# Patient Record
Sex: MALE | Race: BLACK OR AFRICAN AMERICAN | URBAN - METROPOLITAN AREA
[De-identification: names, ages, dates, MRNs, and addresses within clinical notes are randomized per-mention and may not be internally consistent; named-entity substitution may affect disease eponyms.]

---

## 2023-05-06 ENCOUNTER — INPATIENT (INPATIENT)
Facility: HOSPITAL | Age: 83
LOS: 7 days | Discharge: HOME | End: 2023-05-14
Attending: THORACIC SURGERY (CARDIOTHORACIC VASCULAR SURGERY) | Admitting: THORACIC SURGERY (CARDIOTHORACIC VASCULAR SURGERY)
Payer: MEDICARE

## 2023-05-06 VITALS
OXYGEN SATURATION: 100 % | RESPIRATION RATE: 15 BRPM | TEMPERATURE: 98 F | SYSTOLIC BLOOD PRESSURE: 129 MMHG | HEART RATE: 81 BPM | DIASTOLIC BLOOD PRESSURE: 64 MMHG

## 2023-05-06 LAB
ALBUMIN SERPL ELPH-MCNC: 4.2 G/DL — SIGNIFICANT CHANGE UP (ref 3.3–5)
ALP SERPL-CCNC: 84 U/L — SIGNIFICANT CHANGE UP (ref 40–120)
ALT FLD-CCNC: 19 U/L — SIGNIFICANT CHANGE UP (ref 4–41)
ANION GAP SERPL CALC-SCNC: 11 MMOL/L — SIGNIFICANT CHANGE UP (ref 7–14)
AST SERPL-CCNC: 23 U/L — SIGNIFICANT CHANGE UP (ref 4–40)
BASE EXCESS BLDV CALC-SCNC: -0.9 MMOL/L — SIGNIFICANT CHANGE UP (ref -2–3)
BASOPHILS # BLD AUTO: 0.03 K/UL — SIGNIFICANT CHANGE UP (ref 0–0.2)
BASOPHILS NFR BLD AUTO: 0.3 % — SIGNIFICANT CHANGE UP (ref 0–2)
BILIRUB SERPL-MCNC: 0.6 MG/DL — SIGNIFICANT CHANGE UP (ref 0.2–1.2)
BLOOD GAS VENOUS COMPREHENSIVE RESULT: SIGNIFICANT CHANGE UP
BUN SERPL-MCNC: 27 MG/DL — HIGH (ref 7–23)
CALCIUM SERPL-MCNC: 9.5 MG/DL — SIGNIFICANT CHANGE UP (ref 8.4–10.5)
CHLORIDE BLDV-SCNC: 104 MMOL/L — SIGNIFICANT CHANGE UP (ref 96–108)
CHLORIDE SERPL-SCNC: 101 MMOL/L — SIGNIFICANT CHANGE UP (ref 98–107)
CO2 BLDV-SCNC: 24.6 MMOL/L — SIGNIFICANT CHANGE UP (ref 22–26)
CO2 SERPL-SCNC: 26 MMOL/L — SIGNIFICANT CHANGE UP (ref 22–31)
CREAT SERPL-MCNC: 1.69 MG/DL — HIGH (ref 0.5–1.3)
EGFR: 40 ML/MIN/1.73M2 — LOW
EOSINOPHIL # BLD AUTO: 0.02 K/UL — SIGNIFICANT CHANGE UP (ref 0–0.5)
EOSINOPHIL NFR BLD AUTO: 0.2 % — SIGNIFICANT CHANGE UP (ref 0–6)
GAS PNL BLDV: 121 MMOL/L — LOW (ref 136–145)
GAS PNL BLDV: SIGNIFICANT CHANGE UP
GLUCOSE BLDV-MCNC: 405 MG/DL — HIGH (ref 70–99)
GLUCOSE SERPL-MCNC: 442 MG/DL — HIGH (ref 70–99)
HCO3 BLDV-SCNC: 24 MMOL/L — SIGNIFICANT CHANGE UP (ref 22–29)
HCT VFR BLD CALC: 38.1 % — LOW (ref 39–50)
HCT VFR BLDA CALC: 34 % — LOW (ref 39–51)
HGB BLD CALC-MCNC: 11.3 G/DL — LOW (ref 12.6–17.4)
HGB BLD-MCNC: 12.1 G/DL — LOW (ref 13–17)
IANC: 9.68 K/UL — HIGH (ref 1.8–7.4)
IMM GRANULOCYTES NFR BLD AUTO: 0.4 % — SIGNIFICANT CHANGE UP (ref 0–0.9)
LACTATE BLDV-MCNC: 1.7 MMOL/L — SIGNIFICANT CHANGE UP (ref 0.5–2)
LYMPHOCYTES # BLD AUTO: 1.2 K/UL — SIGNIFICANT CHANGE UP (ref 1–3.3)
LYMPHOCYTES # BLD AUTO: 10.3 % — LOW (ref 13–44)
MCHC RBC-ENTMCNC: 27.1 PG — SIGNIFICANT CHANGE UP (ref 27–34)
MCHC RBC-ENTMCNC: 31.8 GM/DL — LOW (ref 32–36)
MCV RBC AUTO: 85.2 FL — SIGNIFICANT CHANGE UP (ref 80–100)
MONOCYTES # BLD AUTO: 0.72 K/UL — SIGNIFICANT CHANGE UP (ref 0–0.9)
MONOCYTES NFR BLD AUTO: 6.2 % — SIGNIFICANT CHANGE UP (ref 2–14)
NEUTROPHILS # BLD AUTO: 9.68 K/UL — HIGH (ref 1.8–7.4)
NEUTROPHILS NFR BLD AUTO: 82.6 % — HIGH (ref 43–77)
NRBC # BLD: 0 /100 WBCS — SIGNIFICANT CHANGE UP (ref 0–0)
NRBC # FLD: 0 K/UL — SIGNIFICANT CHANGE UP (ref 0–0)
PCO2 BLDV: 37 MMHG — LOW (ref 42–55)
PH BLDV: 7.41 — SIGNIFICANT CHANGE UP (ref 7.32–7.43)
PLATELET # BLD AUTO: 179 K/UL — SIGNIFICANT CHANGE UP (ref 150–400)
PO2 BLDV: 36 MMHG — SIGNIFICANT CHANGE UP (ref 25–45)
POTASSIUM BLDV-SCNC: SIGNIFICANT CHANGE UP MMOL/L (ref 3.5–5.1)
POTASSIUM SERPL-MCNC: 4.6 MMOL/L — SIGNIFICANT CHANGE UP (ref 3.5–5.3)
POTASSIUM SERPL-SCNC: 4.6 MMOL/L — SIGNIFICANT CHANGE UP (ref 3.5–5.3)
PROT SERPL-MCNC: 6.2 G/DL — SIGNIFICANT CHANGE UP (ref 6–8.3)
RBC # BLD: 4.47 M/UL — SIGNIFICANT CHANGE UP (ref 4.2–5.8)
RBC # FLD: 13.4 % — SIGNIFICANT CHANGE UP (ref 10.3–14.5)
SAO2 % BLDV: 66.2 % — LOW (ref 67–88)
SODIUM SERPL-SCNC: 138 MMOL/L — SIGNIFICANT CHANGE UP (ref 135–145)
TROPONIN T, HIGH SENSITIVITY RESULT: 23 NG/L — SIGNIFICANT CHANGE UP
WBC # BLD: 11.7 K/UL — HIGH (ref 3.8–10.5)
WBC # FLD AUTO: 11.7 K/UL — HIGH (ref 3.8–10.5)

## 2023-05-06 PROCEDURE — 99285 EMERGENCY DEPT VISIT HI MDM: CPT

## 2023-05-06 PROCEDURE — 71046 X-RAY EXAM CHEST 2 VIEWS: CPT | Mod: 26

## 2023-05-06 RX ORDER — ACETAMINOPHEN 500 MG
975 TABLET ORAL ONCE
Refills: 0 | Status: COMPLETED | OUTPATIENT
Start: 2023-05-06 | End: 2023-05-06

## 2023-05-06 RX ORDER — LIDOCAINE 4 G/100G
1 CREAM TOPICAL ONCE
Refills: 0 | Status: COMPLETED | OUTPATIENT
Start: 2023-05-06 | End: 2023-05-06

## 2023-05-06 RX ORDER — SODIUM CHLORIDE 9 MG/ML
1000 INJECTION INTRAMUSCULAR; INTRAVENOUS; SUBCUTANEOUS ONCE
Refills: 0 | Status: COMPLETED | OUTPATIENT
Start: 2023-05-06 | End: 2023-05-06

## 2023-05-06 RX ADMIN — LIDOCAINE 1 PATCH: 4 CREAM TOPICAL at 23:49

## 2023-05-06 RX ADMIN — Medication 975 MILLIGRAM(S): at 22:42

## 2023-05-06 RX ADMIN — SODIUM CHLORIDE 1000 MILLILITER(S): 9 INJECTION INTRAMUSCULAR; INTRAVENOUS; SUBCUTANEOUS at 22:42

## 2023-05-06 NOTE — ED ADULT NURSE NOTE - OBJECTIVE STATEMENT
83 y/o male with hx CAD with 2 stents, HTN, DM, and chronic back pain presents to the ED c/o LUQ abd pain s/p fall. Pt denies HS, LOC, HA, dizziness, CP, SOB, n/v/d, or any other c/o. Abd soft, nontender, nondistended. No obvious deformities or injuries noted. Pt takes Plavix.

## 2023-05-06 NOTE — ED ADULT TRIAGE NOTE - CHIEF COMPLAINT QUOTE
presents C/O LUQ pain S/P fall. Pt endorsing SOB. on Plavix. No complaints of chest pain, headache, nausea, dizziness, vomiting  SOB, fever, chills verbalized. Pmhx DM chronic backpain.  HTN

## 2023-05-06 NOTE — ED ADULT NURSE NOTE - NSIMPLEMENTINTERV_GEN_ALL_ED
Implemented All Fall Risk Interventions:  [place] to call system. Call bell, personal items and telephone within reach. Instruct patient to call for assistance. Room bathroom lighting operational. Non-slip footwear when patient is off stretcher. Physically safe environment: no spills, clutter or unnecessary equipment. Stretcher in lowest position, wheels locked, appropriate side rails in place. Provide visual cue, wrist band, yellow gown, etc. Monitor gait and stability. Monitor for mental status changes and reorient to person, place, and time. Review medications for side effects contributing to fall risk. Reinforce activity limits and safety measures with patient and family.

## 2023-05-07 DIAGNOSIS — J94.2 HEMOTHORAX: ICD-10-CM

## 2023-05-07 DIAGNOSIS — I10 ESSENTIAL (PRIMARY) HYPERTENSION: ICD-10-CM

## 2023-05-07 DIAGNOSIS — N40.0 BENIGN PROSTATIC HYPERPLASIA WITHOUT LOWER URINARY TRACT SYMPTOMS: ICD-10-CM

## 2023-05-07 DIAGNOSIS — E11.9 TYPE 2 DIABETES MELLITUS WITHOUT COMPLICATIONS: ICD-10-CM

## 2023-05-07 DIAGNOSIS — E78.5 HYPERLIPIDEMIA, UNSPECIFIED: ICD-10-CM

## 2023-05-07 DIAGNOSIS — H40.9 UNSPECIFIED GLAUCOMA: ICD-10-CM

## 2023-05-07 LAB
ANION GAP SERPL CALC-SCNC: 15 MMOL/L — HIGH (ref 7–14)
APTT BLD: 27.6 SEC — SIGNIFICANT CHANGE UP (ref 27–36.3)
BUN SERPL-MCNC: 28 MG/DL — HIGH (ref 7–23)
CALCIUM SERPL-MCNC: 9.2 MG/DL — SIGNIFICANT CHANGE UP (ref 8.4–10.5)
CHLORIDE SERPL-SCNC: 103 MMOL/L — SIGNIFICANT CHANGE UP (ref 98–107)
CO2 SERPL-SCNC: 21 MMOL/L — LOW (ref 22–31)
CREAT SERPL-MCNC: 1.28 MG/DL — SIGNIFICANT CHANGE UP (ref 0.5–1.3)
EGFR: 56 ML/MIN/1.73M2 — LOW
GLUCOSE BLDC GLUCOMTR-MCNC: 311 MG/DL — HIGH (ref 70–99)
GLUCOSE BLDC GLUCOMTR-MCNC: 336 MG/DL — HIGH (ref 70–99)
GLUCOSE BLDC GLUCOMTR-MCNC: 381 MG/DL — HIGH (ref 70–99)
GLUCOSE BLDC GLUCOMTR-MCNC: 414 MG/DL — HIGH (ref 70–99)
GLUCOSE BLDC GLUCOMTR-MCNC: 415 MG/DL — HIGH (ref 70–99)
GLUCOSE SERPL-MCNC: 332 MG/DL — HIGH (ref 70–99)
GLUCOSE SERPL-MCNC: 364 MG/DL — HIGH (ref 70–99)
INR BLD: 1.4 RATIO — HIGH (ref 0.88–1.16)
MAGNESIUM SERPL-MCNC: 2.3 MG/DL — SIGNIFICANT CHANGE UP (ref 1.6–2.6)
PHOSPHATE SERPL-MCNC: 2.9 MG/DL — SIGNIFICANT CHANGE UP (ref 2.5–4.5)
POTASSIUM SERPL-MCNC: 4.4 MMOL/L — SIGNIFICANT CHANGE UP (ref 3.5–5.3)
POTASSIUM SERPL-SCNC: 4.4 MMOL/L — SIGNIFICANT CHANGE UP (ref 3.5–5.3)
PROTHROM AB SERPL-ACNC: 16.3 SEC — HIGH (ref 10.5–13.4)
SODIUM SERPL-SCNC: 139 MMOL/L — SIGNIFICANT CHANGE UP (ref 135–145)

## 2023-05-07 PROCEDURE — 93306 TTE W/DOPPLER COMPLETE: CPT | Mod: 26

## 2023-05-07 PROCEDURE — 99233 SBSQ HOSP IP/OBS HIGH 50: CPT

## 2023-05-07 PROCEDURE — 70450 CT HEAD/BRAIN W/O DYE: CPT | Mod: 26,MA

## 2023-05-07 PROCEDURE — 71250 CT THORAX DX C-: CPT | Mod: 26,MA

## 2023-05-07 RX ORDER — DEXTROSE 50 % IN WATER 50 %
12.5 SYRINGE (ML) INTRAVENOUS ONCE
Refills: 0 | Status: DISCONTINUED | OUTPATIENT
Start: 2023-05-07 | End: 2023-05-08

## 2023-05-07 RX ORDER — INSULIN LISPRO 100/ML
8 VIAL (ML) SUBCUTANEOUS ONCE
Refills: 0 | Status: COMPLETED | OUTPATIENT
Start: 2023-05-07 | End: 2023-05-07

## 2023-05-07 RX ORDER — SENNA PLUS 8.6 MG/1
2 TABLET ORAL AT BEDTIME
Refills: 0 | Status: DISCONTINUED | OUTPATIENT
Start: 2023-05-07 | End: 2023-05-14

## 2023-05-07 RX ORDER — SODIUM CHLORIDE 9 MG/ML
500 INJECTION, SOLUTION INTRAVENOUS ONCE
Refills: 0 | Status: COMPLETED | OUTPATIENT
Start: 2023-05-07 | End: 2023-05-07

## 2023-05-07 RX ORDER — ACETAMINOPHEN 500 MG
650 TABLET ORAL EVERY 6 HOURS
Refills: 0 | Status: DISCONTINUED | OUTPATIENT
Start: 2023-05-07 | End: 2023-05-07

## 2023-05-07 RX ORDER — INSULIN GLARGINE 100 [IU]/ML
20 INJECTION, SOLUTION SUBCUTANEOUS AT BEDTIME
Refills: 0 | Status: DISCONTINUED | OUTPATIENT
Start: 2023-05-07 | End: 2023-05-08

## 2023-05-07 RX ORDER — ACETAMINOPHEN 500 MG
1000 TABLET ORAL EVERY 8 HOURS
Refills: 0 | Status: COMPLETED | OUTPATIENT
Start: 2023-05-07 | End: 2023-05-08

## 2023-05-07 RX ORDER — DEXTROSE 50 % IN WATER 50 %
25 SYRINGE (ML) INTRAVENOUS ONCE
Refills: 0 | Status: DISCONTINUED | OUTPATIENT
Start: 2023-05-07 | End: 2023-05-08

## 2023-05-07 RX ORDER — INSULIN GLARGINE 100 [IU]/ML
10 INJECTION, SOLUTION SUBCUTANEOUS ONCE
Refills: 0 | Status: COMPLETED | OUTPATIENT
Start: 2023-05-07 | End: 2023-05-07

## 2023-05-07 RX ORDER — SODIUM CHLORIDE 9 MG/ML
1000 INJECTION, SOLUTION INTRAVENOUS
Refills: 0 | Status: DISCONTINUED | OUTPATIENT
Start: 2023-05-07 | End: 2023-05-08

## 2023-05-07 RX ORDER — SIMVASTATIN 20 MG/1
20 TABLET, FILM COATED ORAL AT BEDTIME
Refills: 0 | Status: DISCONTINUED | OUTPATIENT
Start: 2023-05-07 | End: 2023-05-14

## 2023-05-07 RX ORDER — INSULIN LISPRO 100/ML
15 VIAL (ML) SUBCUTANEOUS
Refills: 0 | Status: DISCONTINUED | OUTPATIENT
Start: 2023-05-07 | End: 2023-05-08

## 2023-05-07 RX ORDER — FINASTERIDE 5 MG/1
5 TABLET, FILM COATED ORAL DAILY
Refills: 0 | Status: DISCONTINUED | OUTPATIENT
Start: 2023-05-07 | End: 2023-05-14

## 2023-05-07 RX ORDER — POLYETHYLENE GLYCOL 3350 17 G/17G
17 POWDER, FOR SOLUTION ORAL DAILY
Refills: 0 | Status: DISCONTINUED | OUTPATIENT
Start: 2023-05-07 | End: 2023-05-14

## 2023-05-07 RX ORDER — PHYTONADIONE (VIT K1) 5 MG
5 TABLET ORAL ONCE
Refills: 0 | Status: COMPLETED | OUTPATIENT
Start: 2023-05-07 | End: 2023-05-07

## 2023-05-07 RX ORDER — SODIUM CHLORIDE 9 MG/ML
250 INJECTION, SOLUTION INTRAVENOUS ONCE
Refills: 0 | Status: COMPLETED | OUTPATIENT
Start: 2023-05-07 | End: 2023-05-07

## 2023-05-07 RX ORDER — ASPIRIN/CALCIUM CARB/MAGNESIUM 324 MG
81 TABLET ORAL DAILY
Refills: 0 | Status: DISCONTINUED | OUTPATIENT
Start: 2023-05-07 | End: 2023-05-11

## 2023-05-07 RX ORDER — OXYCODONE HYDROCHLORIDE 5 MG/1
10 TABLET ORAL EVERY 8 HOURS
Refills: 0 | Status: DISCONTINUED | OUTPATIENT
Start: 2023-05-07 | End: 2023-05-11

## 2023-05-07 RX ORDER — GLUCAGON INJECTION, SOLUTION 0.5 MG/.1ML
1 INJECTION, SOLUTION SUBCUTANEOUS ONCE
Refills: 0 | Status: DISCONTINUED | OUTPATIENT
Start: 2023-05-07 | End: 2023-05-08

## 2023-05-07 RX ORDER — OXYCODONE HYDROCHLORIDE 5 MG/1
5 TABLET ORAL EVERY 6 HOURS
Refills: 0 | Status: DISCONTINUED | OUTPATIENT
Start: 2023-05-07 | End: 2023-05-11

## 2023-05-07 RX ORDER — CARVEDILOL PHOSPHATE 80 MG/1
12.5 CAPSULE, EXTENDED RELEASE ORAL EVERY 12 HOURS
Refills: 0 | Status: DISCONTINUED | OUTPATIENT
Start: 2023-05-07 | End: 2023-05-10

## 2023-05-07 RX ORDER — LIDOCAINE 4 G/100G
1 CREAM TOPICAL DAILY
Refills: 0 | Status: DISCONTINUED | OUTPATIENT
Start: 2023-05-07 | End: 2023-05-14

## 2023-05-07 RX ORDER — INSULIN LISPRO 100/ML
VIAL (ML) SUBCUTANEOUS
Refills: 0 | Status: DISCONTINUED | OUTPATIENT
Start: 2023-05-07 | End: 2023-05-08

## 2023-05-07 RX ORDER — DEXTROSE 50 % IN WATER 50 %
25 SYRINGE (ML) INTRAVENOUS ONCE
Refills: 0 | Status: DISCONTINUED | OUTPATIENT
Start: 2023-05-07 | End: 2023-05-14

## 2023-05-07 RX ORDER — DEXTROSE 50 % IN WATER 50 %
15 SYRINGE (ML) INTRAVENOUS ONCE
Refills: 0 | Status: DISCONTINUED | OUTPATIENT
Start: 2023-05-07 | End: 2023-05-08

## 2023-05-07 RX ADMIN — Medication 400 MILLIGRAM(S): at 18:33

## 2023-05-07 RX ADMIN — Medication 1000 MILLIGRAM(S): at 19:00

## 2023-05-07 RX ADMIN — Medication 4: at 10:09

## 2023-05-07 RX ADMIN — FINASTERIDE 5 MILLIGRAM(S): 5 TABLET, FILM COATED ORAL at 12:27

## 2023-05-07 RX ADMIN — Medication 6: at 17:02

## 2023-05-07 RX ADMIN — LIDOCAINE 1 PATCH: 4 CREAM TOPICAL at 12:54

## 2023-05-07 RX ADMIN — OXYCODONE HYDROCHLORIDE 10 MILLIGRAM(S): 5 TABLET ORAL at 12:27

## 2023-05-07 RX ADMIN — Medication 81 MILLIGRAM(S): at 12:27

## 2023-05-07 RX ADMIN — LIDOCAINE 1 PATCH: 4 CREAM TOPICAL at 20:00

## 2023-05-07 RX ADMIN — Medication 15 UNIT(S): at 17:00

## 2023-05-07 RX ADMIN — POLYETHYLENE GLYCOL 3350 17 GRAM(S): 17 POWDER, FOR SOLUTION ORAL at 12:28

## 2023-05-07 RX ADMIN — OXYCODONE HYDROCHLORIDE 10 MILLIGRAM(S): 5 TABLET ORAL at 13:00

## 2023-05-07 RX ADMIN — OXYCODONE HYDROCHLORIDE 10 MILLIGRAM(S): 5 TABLET ORAL at 21:45

## 2023-05-07 RX ADMIN — Medication 101 MILLIGRAM(S): at 12:27

## 2023-05-07 RX ADMIN — LIDOCAINE 1 PATCH: 4 CREAM TOPICAL at 11:00

## 2023-05-07 RX ADMIN — INSULIN GLARGINE 10 UNIT(S): 100 INJECTION, SOLUTION SUBCUTANEOUS at 12:54

## 2023-05-07 RX ADMIN — Medication 8 UNIT(S): at 20:30

## 2023-05-07 RX ADMIN — LIDOCAINE 1 PATCH: 4 CREAM TOPICAL at 07:45

## 2023-05-07 RX ADMIN — Medication 1000 MILLIGRAM(S): at 10:30

## 2023-05-07 RX ADMIN — INSULIN GLARGINE 20 UNIT(S): 100 INJECTION, SOLUTION SUBCUTANEOUS at 21:15

## 2023-05-07 RX ADMIN — SIMVASTATIN 20 MILLIGRAM(S): 20 TABLET, FILM COATED ORAL at 21:14

## 2023-05-07 RX ADMIN — OXYCODONE HYDROCHLORIDE 10 MILLIGRAM(S): 5 TABLET ORAL at 20:45

## 2023-05-07 RX ADMIN — SODIUM CHLORIDE 100 MILLILITER(S): 9 INJECTION, SOLUTION INTRAVENOUS at 19:49

## 2023-05-07 RX ADMIN — CARVEDILOL PHOSPHATE 12.5 MILLIGRAM(S): 80 CAPSULE, EXTENDED RELEASE ORAL at 18:34

## 2023-05-07 RX ADMIN — Medication 400 MILLIGRAM(S): at 10:00

## 2023-05-07 RX ADMIN — SODIUM CHLORIDE 500 MILLILITER(S): 9 INJECTION, SOLUTION INTRAVENOUS at 18:35

## 2023-05-07 RX ADMIN — SODIUM CHLORIDE 250 MILLILITER(S): 9 INJECTION, SOLUTION INTRAVENOUS at 09:42

## 2023-05-07 RX ADMIN — SODIUM CHLORIDE 100 MILLILITER(S): 9 INJECTION, SOLUTION INTRAVENOUS at 09:42

## 2023-05-07 NOTE — H&P ADULT - PROBLEM SELECTOR PLAN 1
1. Admit to Thoracic Surgery / CTICU  2. Incentive Spirometer  3. Pain management  4. IV Hydration  5. Cardiac Clearance / ECHO  6. Will need OR for VATS and clot evacuation one P2Y-12 level is normal, holding Plavix, continuing ASA  7. Discussed with Dr. Hassan

## 2023-05-07 NOTE — H&P ADULT - RESPIRATORY
no wheezes/no rales/no rhonchi/no respiratory distress/no use of accessory muscles/no subcutaneous emphysema/airway patent/good air movement/respirations non-labored

## 2023-05-07 NOTE — ED PROVIDER NOTE - NS ED ROS FT
CONSTITUTIONAL: No fevers, no chills, no lightheadedness, no dizziness  EYES: no visual changes, no eye pain  EARS: no ear drainage, no ear pain, no change in hearing  NOSE: no nasal congestion  MOUTH/THROAT: no sore throat  CV: No chest pain, no palpitations  RESP: No SOB, no cough  GI: No n/v/d, no abd pain  : no dysuria, no hematuria, no flank pain  MSK: no back pain, no extremity pain, + r. sided chest wall pain/rib pain  SKIN: no rashes  NEURO: no headache, no focal weakness, no decreased sensation/parasthesias   PSYCHIATRIC: no known mental health issues

## 2023-05-07 NOTE — PROGRESS NOTE ADULT - SUBJECTIVE AND OBJECTIVE BOX
CHIEF COMPLAINT: FOLLOW UP IN ICU FOR Left rib fractures left hemothorax      ISSUES:     Left 7-10 rib fractures  Left hemothorax  Pulmonary contusion  NIAN  Uncontrolled hyperglycemia  Pain  CAD with stents ~13y ago on ASA and plavix  HTN  DM  Hyperlipidemia  BPH  Glaucoma      INTERVAL EVENTS:     Presented to Er after fall at home, said he did get dizzy, did not lose consciousness. CT head negative for any acute intracranial abnormality. CT chest with left 7th-10th rib fractures, left hemothorax.  Denies pain/trauma to abdomen    Awake, on room air, normotensive. Hyperglycemic.    Plan for OR later this week after holding plavix/continue Asa 81, cardiology clearance. Blood sugars need controlled, start IVF for likely prerenal NAIN. Recheck BMP this afternoon    Needs pain control with rib fractures to avoid atelectasis/PNA, underlying pulmonary contusion.          HISTORY:   Patient reports moderate pain at chest wall     PHYSICAL EXAM:   Gen: Comfortable, No acute distress  Eyes: Sclera white, Conjunctiva normal, Eyelids normal, Pupils symmetrical   ENT: Mucous membranes moist,  ,  ,    Neck: Trachea midline,  ,  ,  ,  ,  ,    CV: Rate regular, Rhythm regular,  ,  ,    Resp: Breath sounds clear, No accessory muscles use, chest wall tenderness.  Abd: Soft, Non-distended, Non-tender, Bowel sounds normal,  ,  ,    Skin: Warm, No peripheral edema of lower extremities,  ,    : No briscoe  Neuro: Moving all 4 extremities,    Psych: A&Ox3      ASSESSMENT AND PLAN:     NEURO:  Rib fractures, chest Pain - Stable. Pain control with dilaudid and Tylenol IV              RESPIRATORY:  Hypoxia - Wean nasal cannula for goal O2sat above 92. Obtain CXR. Incentive spirometry. Chest PT and frequent suctioning. Continue bronchodilators. OOB to chair & ambulate w/ assistance. Continuous pulse oximetry for support & to prevent decompensation.       Hemothorax- Needs to go to OR later this week for LVATS, drainage of hemothorax and decortication after holding plavix and cardiology clearance            CARDIOVASCULAR:  Hemodynamically stable - Not on pressors. Continue hemodynamic monitoring.  Telemetry (medical test) - Reviewed by me today independently. Normal sinus rhythm.   CAD- hold plavix for surgery, continue ASa 81, continue coreg/statin. Check echo, cardiology consult. Stents ~ 13y ago per patient.             RENAL:  NAIN- Likely prerenal, Start IVF, monitor urine output. Recheck BMP this afternoon.  Monitor IOs and electrolytes. Keep K above 4.0 and Mg above 2.0.          GASTROINTESTINAL:  GI prophylaxis not indicated  Zofran and Reglan IV PRN for nausea  Regular consistency diet             HEMATOLOGIC:  No signs of active bleeding. Monitor Hgb in CBC in AM  DVT prophylaxis with SCDs, holding off heparin SC in setting of hemothorax.           INFECTIOUS DISEASE:  All surgical sites appear clean. No signs of active infection. Will monitor for fever and leukocytosis.               ENDOCRINE:  Stable – Monitor glucose fingersticks for goal 120-180.               Pertinent clinical, laboratory, radiographic, hemodynamic, echocardiographic, respiratory data, microbiologic data and chart were reviewed by myself and analyzed frequently throughout the course of the day and night by myself.    Plan discussed at length with the CTICU staff and Attending CT Surgeon -   Dr Mathieu Hassan.    Patient's status was discussed with patient at bedside.    ________________________________________________    _________________________  VITAL SIGNS:  Vital Signs Last 24 Hrs  T(C): 37 (07 May 2023 07:30), Max: 37.2 (07 May 2023 01:15)  T(F): 98.6 (07 May 2023 07:30), Max: 98.9 (07 May 2023 01:15)  HR: 71 (07 May 2023 09:00) (71 - 89)  BP: 146/61 (07 May 2023 08:00) (124/63 - 146/61)  BP(mean): 84 (07 May 2023 08:00) (84 - 90)  RR: 14 (07 May 2023 09:00) (14 - 20)  SpO2: 99% (07 May 2023 09:00) (99% - 100%)    Parameters below as of 07 May 2023 09:00  Patient On (Oxygen Delivery Method): room air      I/Os:   I&O's Detail    07 May 2023 07:01  -  07 May 2023 11:38  --------------------------------------------------------  IN:    Lactated Ringers: 200 mL    Lactated Ringers Bolus: 500 mL  Total IN: 700 mL    OUT:    Voided (mL): 150 mL  Total OUT: 150 mL    Total NET: 550 mL              MEDICATIONS:  MEDICATIONS  (STANDING):  acetaminophen   IVPB .. 1000 milliGRAM(s) IV Intermittent every 8 hours  aspirin enteric coated 81 milliGRAM(s) Oral daily  carvedilol 12.5 milliGRAM(s) Oral every 12 hours  dextrose 5%. 1000 milliLiter(s) (50 mL/Hr) IV Continuous <Continuous>  dextrose 5%. 1000 milliLiter(s) (100 mL/Hr) IV Continuous <Continuous>  dextrose 50% Injectable 25 Gram(s) IV Push once  dextrose 50% Injectable 25 Gram(s) IV Push once  dextrose 50% Injectable 12.5 Gram(s) IV Push once  finasteride 5 milliGRAM(s) Oral daily  glucagon  Injectable 1 milliGRAM(s) IntraMuscular once  insulin glargine Injectable (LANTUS) 20 Unit(s) SubCutaneous at bedtime  insulin glargine Injectable (LANTUS) 10 Unit(s) SubCutaneous once  insulin lispro (ADMELOG) corrective regimen sliding scale   SubCutaneous three times a day before meals  insulin lispro Injectable (ADMELOG) 15 Unit(s) SubCutaneous three times a day before meals  lactated ringers. 1000 milliLiter(s) (100 mL/Hr) IV Continuous <Continuous>  lidocaine   4% Patch 1 Patch Transdermal daily  phytonadione  IVPB 5 milliGRAM(s) IV Intermittent once  polyethylene glycol 3350 17 Gram(s) Oral daily  senna 2 Tablet(s) Oral at bedtime  simvastatin 20 milliGRAM(s) Oral at bedtime    MEDICATIONS  (PRN):  dextrose Oral Gel 15 Gram(s) Oral once PRN Blood Glucose LESS THAN 70 milliGRAM(s)/deciliter  oxyCODONE    IR 10 milliGRAM(s) Oral every 8 hours PRN Severe Pain (7 - 10)  oxyCODONE    IR 5 milliGRAM(s) Oral every 6 hours PRN Moderate Pain (4 - 6)      LABS:  Laboratory data was independently reviewed by me today.                           12.1   11.70 )-----------( 179      ( 06 May 2023 22:15 )             38.1     05-07    x   |  x   |  x   ----------------------------<  332<H>  x    |  x   |  x     Ca    9.5      06 May 2023 22:15    TPro  6.2  /  Alb  4.2  /  TBili  0.6  /  DBili  x   /  AST  23  /  ALT  19  /  AlkPhos  84  05-06    LIVER FUNCTIONS - ( 06 May 2023 22:15 )  Alb: 4.2 g/dL / Pro: 6.2 g/dL / ALK PHOS: 84 U/L / ALT: 19 U/L / AST: 23 U/L / GGT: x           PT/INR - ( 07 May 2023 09:15 )   PT: 16.3 sec;   INR: 1.40 ratio         PTT - ( 07 May 2023 09:15 )  PTT:27.6 sec        RADIOLOGY:   Radiology images were independently reviewed by me today. Reports were reviewed by me today.    Xray Chest 2 Views PA/Lat:   ACC: [id number] EXAM:  XR CHEST PA LAT 2V   ORDERED BY: REGI TORRES     PROCEDURE DATE:  05/06/2023          INTERPRETATION:  EXAMINATION: XR CHEST PA AND LATERAL    CLINICAL INDICATION: Left chest wall tenderness.    TECHNIQUE: 2 views; Frontal and lateral views of the chest were obtained.    COMPARISON: None.    FINDINGS:  The heart is normal in size.  Hazy left opacity reflecting large left pleural effusion with atelectasis.  Right lung is clear. No pneumothorax.    IMPRESSION:  Large left pleural effusion with atelectasis.    --- End of Report ---          MARIAH GOLD MD; Resident Radiologist  This document has been electronically signed.  FREDDY WISE MD; Attending Radiologist  This document has been electronically signed. May  7 2023 7:57AM (05-06-23 @ 23:02)    _________________________  VITAL SIGNS:  Vital Signs Last 24 Hrs  T(C): 37 (07 May 2023 07:30), Max: 37.2 (07 May 2023 01:15)  T(F): 98.6 (07 May 2023 07:30), Max: 98.9 (07 May 2023 01:15)  HR: 71 (07 May 2023 09:00) (71 - 89)  BP: 146/61 (07 May 2023 08:00) (124/63 - 146/61)  BP(mean): 84 (07 May 2023 08:00) (84 - 90)  RR: 14 (07 May 2023 09:00) (14 - 20)  SpO2: 99% (07 May 2023 09:00) (99% - 100%)    Parameters below as of 07 May 2023 09:00  Patient On (Oxygen Delivery Method): room air      I/Os:   I&O's Detail    07 May 2023 07:01  -  07 May 2023 11:24  --------------------------------------------------------  IN:    Lactated Ringers: 200 mL    Lactated Ringers Bolus: 500 mL  Total IN: 700 mL    OUT:    Voided (mL): 150 mL  Total OUT: 150 mL    Total NET: 550 mL              MEDICATIONS:  MEDICATIONS  (STANDING):  acetaminophen   IVPB .. 1000 milliGRAM(s) IV Intermittent every 8 hours  aspirin enteric coated 81 milliGRAM(s) Oral daily  carvedilol 12.5 milliGRAM(s) Oral every 12 hours  dextrose 5%. 1000 milliLiter(s) (100 mL/Hr) IV Continuous <Continuous>  dextrose 5%. 1000 milliLiter(s) (50 mL/Hr) IV Continuous <Continuous>  dextrose 50% Injectable 25 Gram(s) IV Push once  dextrose 50% Injectable 12.5 Gram(s) IV Push once  dextrose 50% Injectable 25 Gram(s) IV Push once  finasteride 5 milliGRAM(s) Oral daily  glucagon  Injectable 1 milliGRAM(s) IntraMuscular once  insulin glargine Injectable (LANTUS) 10 Unit(s) SubCutaneous once  insulin glargine Injectable (LANTUS) 20 Unit(s) SubCutaneous at bedtime  insulin lispro (ADMELOG) corrective regimen sliding scale   SubCutaneous three times a day before meals  insulin lispro Injectable (ADMELOG) 15 Unit(s) SubCutaneous three times a day before meals  lactated ringers. 1000 milliLiter(s) (100 mL/Hr) IV Continuous <Continuous>  lidocaine   4% Patch 1 Patch Transdermal daily  phytonadione  IVPB 5 milliGRAM(s) IV Intermittent once  polyethylene glycol 3350 17 Gram(s) Oral daily  senna 2 Tablet(s) Oral at bedtime  simvastatin 20 milliGRAM(s) Oral at bedtime    MEDICATIONS  (PRN):  dextrose Oral Gel 15 Gram(s) Oral once PRN Blood Glucose LESS THAN 70 milliGRAM(s)/deciliter  oxyCODONE    IR 10 milliGRAM(s) Oral every 8 hours PRN Severe Pain (7 - 10)  oxyCODONE    IR 5 milliGRAM(s) Oral every 6 hours PRN Moderate Pain (4 - 6)      LABS:  Laboratory data was independently reviewed by me today.                           12.1   11.70 )-----------( 179      ( 06 May 2023 22:15 )             38.1     05-07    x   |  x   |  x   ----------------------------<  332<H>  x    |  x   |  x     Ca    9.5      06 May 2023 22:15    TPro  6.2  /  Alb  4.2  /  TBili  0.6  /  DBili  x   /  AST  23  /  ALT  19  /  AlkPhos  84  05-06    LIVER FUNCTIONS - ( 06 May 2023 22:15 )  Alb: 4.2 g/dL / Pro: 6.2 g/dL / ALK PHOS: 84 U/L / ALT: 19 U/L / AST: 23 U/L / GGT: x           PT/INR - ( 07 May 2023 09:15 )   PT: 16.3 sec;   INR: 1.40 ratio         PTT - ( 07 May 2023 09:15 )  PTT:27.6 sec        RADIOLOGY:   Radiology images were independently reviewed by me today. Reports were reviewed by me today.    Xray Chest 2 Views PA/Lat:   ACC: [id number] EXAM:  XR CHEST PA LAT 2V   ORDERED BY: REGI TORRES     PROCEDURE DATE:  05/06/2023          INTERPRETATION:  EXAMINATION: XR CHEST PA AND LATERAL    CLINICAL INDICATION: Left chest wall tenderness.    TECHNIQUE: 2 views; Frontal and lateral views of the chest were obtained.    COMPARISON: None.    FINDINGS:  The heart is normal in size.  Hazy left opacity reflecting large left pleural effusion with atelectasis.  Right lung is clear. No pneumothorax.    IMPRESSION:  Large left pleural effusion with atelectasis.    --- End of Report ---          MARIAH GOLD MD; Resident Radiologist  This document has been electronically signed.  FREDDY WISE MD; Attending Radiologist  This document has been electronically signed. May  7 2023 7:57AM (05-06-23 @ 23:02)

## 2023-05-07 NOTE — H&P ADULT - HISTORY OF PRESENT ILLNESS
83 y/o M w/ Hx of HTN, HLD, DM, CAD s/p Stents (13 years ago) on Aspirin & Plavix who had a mechanical fall at home yesterday. He states that he was in bed and that when he was getting out of bed he fell and hit his left chest wall on the radiator. He denies hitting his head and LOC. Initially he was treated at home by his family but later in the day he became cool and clammy and had a possible syncopal episode, again he denies hitting his head and LOC. At this point her was brought to the ED.     In the ED he had a CT scan and was found to have fractured ribs (7-10) on the left side and a moderate hemothorax. He denies CP, SOB, & dyspnea. States that his left sided chest pain is controlled.     He was admitted directly to the CTICU for observation.  81 y/o M w/ Hx of HTN, HLD, DM, CAD s/p Stents (13 years ago) on Aspirin & Plavix who had a mechanical fall at home yesterday. He states that he was in bed and that when he was getting out of bed he fell and hit his left chest wall on the radiator. He denies hitting his head and LOC. Initially he was treated at home by his family but later in the day he became cool and clammy and had a possible syncopal episode, again he denies hitting his head and LOC. At this point he was brought to the ED.     In the ED he had a CT scan and was found to have fractured ribs (7-10) on the left side and a moderate hemothorax. He denies CP, SOB, & dyspnea. States that his left sided chest pain is controlled.     He was admitted directly to the CTICU for observation.

## 2023-05-07 NOTE — H&P ADULT - GASTROINTESTINAL
soft/nontender/nondistended/normal active bowel sounds/no guarding/no rigidity/no organomegaly negative

## 2023-05-07 NOTE — ED PROVIDER NOTE - ATTENDING CONTRIBUTION TO CARE
I have personally performed a face to face medical and diagnostic evaluation of the patient. I have discussed with and reviewed the Resident's note and agree with the History, ROS, Physical Exam and MDM unless otherwise indicated. A brief summary of my personal evaluation and impression can be found below.     82y M w/ pMHx of hypertension, diabetes, CAD on aspirin and Plavix presents to the emergency department status post fall w/ L lateral rib pain and one episode of syncope after fall.  Patient states he he was climbing out of bed when he fell and he hit the radiator with the left side of his chest wall. Denies head trauma or LOC, fall witnessed by wife who accompanied pt and confirms story. Nephew was also present for visit. Nephew states his aunt called him to come and assess pt as he works in medical field, when he got there, he helped patient into bed and when patient went to stand, had syncopal episode lasting a few seconds before patient returned to baseline mental status. Nephew states that patient was caught by him and placed back in bed after syncope and patient complained of tunnel vision and lightheadedness when attempting to stand. Denies headache, chest pain, sob, cough, nausea, vomiting, focal weakness or numbness. Patient does complain of pain to L lateral chest wall with deep inspiration. Had not taken anything for pain.     On exam: +ttp to L lateral ribs 7-10, no ecchymosis, no gross deformity, no LUQ tenderness, abd soft, NT to palpation, no cva tenderness, lungs ctab b/l, A&Ox4, VS WNL, no m/r/g, no LE ecchymosis    A/P: C/f rib fx, no clinical concern for pneumothorax at this time, abd soft, no c/f splenic laceration, will obtain CT chest, CT head and C-spine, labs, pain meds, EKG, and reassess. Dispo pending w/u and ambulation. Syncope likely 2/2 vasovagal from pain, no clinical suspicion at this time for ACS.

## 2023-05-07 NOTE — ED PROVIDER NOTE - OBJECTIVE STATEMENT
Patient is a 82-year-old male with a past medical history of hypertension, diabetes, CAD on aspirin and Plavix.  Who presents to the emergency department status post fall.  Patient states he he was climbing out of bed when he fell and he hit the radiator with the left side of his chest.  Patient is now complaining of left upper quadrant pain and left-sided rib pain.  Patient also states it hurts when he takes a deep breath.  However, after the fall he did not come into the emergency department to get evaluated.  The fall was around 2 PM.  Patient was given symptomatic treatment and by his family.  A few hours after the fall the patient became cold and clammy, had a questionable syncopal episode with no preceding symptoms.  Patient did not hit his head and had loss of consciousness according to the family members.  EMS was called at this point and the patient was transported to the emergency department.  Patient denies any chest pain, fever, loss of vision, blurry vision, neurological deficits at this time.

## 2023-05-07 NOTE — H&P ADULT - PROBLEM SELECTOR PLAN 3
1. Diabetic Diet  2. 10 Lantus now (FS > 300 and not treated in ED)  3. Resume home Lantus dose  4. Resume meal time insulin doses  5. ISS  6. FS with meals and at bedtime

## 2023-05-07 NOTE — ED PROVIDER NOTE - PROGRESS NOTE DETAILS
Dr. Pasquale Kumar DO (ED ATTENDING):  Imaging showing multiple left-sided rib fractures with associated moderate hemothorax and atelectasis.  Patient comfortable, breathing on room air with no respiratory distress.  Case discussed with trauma surgery at [facility] for potential transfer (Dr. PEREA), who is recommending we consult CT surgery for hemothorax to decide if patient meets criteria for transfer.  CT surgery saw patient and requesting patient to be admitted to our hospital.  Patient aware of plan of care

## 2023-05-07 NOTE — PATIENT PROFILE ADULT - FALL HARM RISK - HARM RISK INTERVENTIONS
Communicate Risk of Fall with Harm to all staff/Reinforce activity limits and safety measures with patient and family/Tailored Fall Risk Interventions/Visual Cue: Yellow wristband and red socks/Bed in lowest position, wheels locked, appropriate side rails in place/Call bell, personal items and telephone in reach/Instruct patient to call for assistance before getting out of bed or chair/Non-slip footwear when patient is out of bed/[place] to call system/Physically safe environment - no spills, clutter or unnecessary equipment/Purposeful Proactive Rounding/Room/bathroom lighting operational, light cord in reach Assistance with ambulation/Assistance OOB with selected safe patient handling equipment/Communicate Risk of Fall with Harm to all staff/Discuss with provider need for PT consult/Monitor gait and stability/Provide patient with walking aids - walker, cane, crutches/Reinforce activity limits and safety measures with patient and family/Tailored Fall Risk Interventions/Visual Cue: Yellow wristband and red socks/Bed in lowest position, wheels locked, appropriate side rails in place/Call bell, personal items and telephone in reach/Instruct patient to call for assistance before getting out of bed or chair/Non-slip footwear when patient is out of bed/[place] to call system/Physically safe environment - no spills, clutter or unnecessary equipment/Purposeful Proactive Rounding/Room/bathroom lighting operational, light cord in reach

## 2023-05-07 NOTE — ED PROVIDER NOTE - PHYSICAL EXAMINATION
Physical Exam:  Gen: NAD, AOx3, non-toxic appearing, able to ambulate without assistance  Head: NCAT  HEENT: EOMI, PEERLA, normal conjunctiva, tongue midline, oral mucosa moist  Lung: CTAB, no respiratory distress, no wheezes/rhonchi/rales B/L, speaking in full sentences  CV: RRR  Abd: soft, NT, ND, no guarding, no rigidity, no rebound tenderness, no CVA tenderness   MSK: no visible deformities, ROM normal in UE/LE, no back pain, + left sided chest wall tenderness.   Neuro: No focal sensory or motor deficits  Skin: Warm, well perfused, no rash, no leg swelling  Psych: normal affect, calm

## 2023-05-07 NOTE — H&P ADULT - NSICDXPASTMEDICALHX_GEN_ALL_CORE_FT
PAST MEDICAL HISTORY:  BPH (benign prostatic hyperplasia)     CAD (coronary artery disease)     DM (diabetes mellitus)     Glaucoma     HLD (hyperlipidemia)     HTN (hypertension)

## 2023-05-07 NOTE — ED PROVIDER NOTE - CLINICAL SUMMARY MEDICAL DECISION MAKING FREE TEXT BOX
Patient presents emergency department complaining of shortness of breath and status post fall.  Patient is hemodynamically stable afebrile on presentation.  Patient satting well on room air.  Given patient's presentation and history we will obtain cardiac work-up to rule out any acute ACS causes.  We will also obtain CT head and CT chest to evaluate given patient's thinners.  Pending labs and imaging for disposition.  Patient's physical exam is significant for left-sided chest wall tenderness otherwise unremarkable.

## 2023-05-08 LAB
ANION GAP SERPL CALC-SCNC: 10 MMOL/L — SIGNIFICANT CHANGE UP (ref 7–14)
ANION GAP SERPL CALC-SCNC: 11 MMOL/L — SIGNIFICANT CHANGE UP (ref 7–14)
ANION GAP SERPL CALC-SCNC: 11 MMOL/L — SIGNIFICANT CHANGE UP (ref 7–14)
APTT BLD: 23.7 SEC — LOW (ref 27–36.3)
APTT BLD: 23.9 SEC — LOW (ref 27–36.3)
BUN SERPL-MCNC: 27 MG/DL — HIGH (ref 7–23)
BUN SERPL-MCNC: 29 MG/DL — HIGH (ref 7–23)
BUN SERPL-MCNC: 36 MG/DL — HIGH (ref 7–23)
CALCIUM SERPL-MCNC: 8.9 MG/DL — SIGNIFICANT CHANGE UP (ref 8.4–10.5)
CALCIUM SERPL-MCNC: 9.1 MG/DL — SIGNIFICANT CHANGE UP (ref 8.4–10.5)
CALCIUM SERPL-MCNC: 9.2 MG/DL — SIGNIFICANT CHANGE UP (ref 8.4–10.5)
CHLORIDE SERPL-SCNC: 104 MMOL/L — SIGNIFICANT CHANGE UP (ref 98–107)
CHLORIDE SERPL-SCNC: 105 MMOL/L — SIGNIFICANT CHANGE UP (ref 98–107)
CHLORIDE SERPL-SCNC: 105 MMOL/L — SIGNIFICANT CHANGE UP (ref 98–107)
CO2 SERPL-SCNC: 25 MMOL/L — SIGNIFICANT CHANGE UP (ref 22–31)
CO2 SERPL-SCNC: 25 MMOL/L — SIGNIFICANT CHANGE UP (ref 22–31)
CO2 SERPL-SCNC: 27 MMOL/L — SIGNIFICANT CHANGE UP (ref 22–31)
CREAT SERPL-MCNC: 1.29 MG/DL — SIGNIFICANT CHANGE UP (ref 0.5–1.3)
CREAT SERPL-MCNC: 1.3 MG/DL — SIGNIFICANT CHANGE UP (ref 0.5–1.3)
CREAT SERPL-MCNC: 1.51 MG/DL — HIGH (ref 0.5–1.3)
EGFR: 46 ML/MIN/1.73M2 — LOW
EGFR: 55 ML/MIN/1.73M2 — LOW
EGFR: 56 ML/MIN/1.73M2 — LOW
GLUCOSE BLDC GLUCOMTR-MCNC: 217 MG/DL — HIGH (ref 70–99)
GLUCOSE BLDC GLUCOMTR-MCNC: 226 MG/DL — HIGH (ref 70–99)
GLUCOSE BLDC GLUCOMTR-MCNC: 235 MG/DL — HIGH (ref 70–99)
GLUCOSE BLDC GLUCOMTR-MCNC: 249 MG/DL — HIGH (ref 70–99)
GLUCOSE BLDC GLUCOMTR-MCNC: 278 MG/DL — HIGH (ref 70–99)
GLUCOSE BLDC GLUCOMTR-MCNC: 90 MG/DL — SIGNIFICANT CHANGE UP (ref 70–99)
GLUCOSE SERPL-MCNC: 124 MG/DL — HIGH (ref 70–99)
GLUCOSE SERPL-MCNC: 216 MG/DL — HIGH (ref 70–99)
GLUCOSE SERPL-MCNC: 263 MG/DL — HIGH (ref 70–99)
HCT VFR BLD CALC: 26.9 % — LOW (ref 39–50)
HCT VFR BLD CALC: 30.1 % — LOW (ref 39–50)
HCT VFR BLD CALC: 31 % — LOW (ref 39–50)
HCT VFR BLD CALC: 31.5 % — LOW (ref 39–50)
HGB BLD-MCNC: 8.6 G/DL — LOW (ref 13–17)
HGB BLD-MCNC: 9.4 G/DL — LOW (ref 13–17)
HGB BLD-MCNC: 9.7 G/DL — LOW (ref 13–17)
HGB BLD-MCNC: 9.7 G/DL — LOW (ref 13–17)
INR BLD: 1.2 RATIO — HIGH (ref 0.88–1.16)
INR BLD: 1.4 RATIO — HIGH (ref 0.88–1.16)
LACTATE SERPL-SCNC: 1.2 MMOL/L — SIGNIFICANT CHANGE UP (ref 0.5–2)
MAGNESIUM SERPL-MCNC: 2.1 MG/DL — SIGNIFICANT CHANGE UP (ref 1.6–2.6)
MAGNESIUM SERPL-MCNC: 2.2 MG/DL — SIGNIFICANT CHANGE UP (ref 1.6–2.6)
MAGNESIUM SERPL-MCNC: 2.3 MG/DL — SIGNIFICANT CHANGE UP (ref 1.6–2.6)
MCHC RBC-ENTMCNC: 26.6 PG — LOW (ref 27–34)
MCHC RBC-ENTMCNC: 26.8 PG — LOW (ref 27–34)
MCHC RBC-ENTMCNC: 26.9 PG — LOW (ref 27–34)
MCHC RBC-ENTMCNC: 27.3 PG — SIGNIFICANT CHANGE UP (ref 27–34)
MCHC RBC-ENTMCNC: 30.8 GM/DL — LOW (ref 32–36)
MCHC RBC-ENTMCNC: 31.2 GM/DL — LOW (ref 32–36)
MCHC RBC-ENTMCNC: 31.3 GM/DL — LOW (ref 32–36)
MCHC RBC-ENTMCNC: 32 GM/DL — SIGNIFICANT CHANGE UP (ref 32–36)
MCV RBC AUTO: 85.3 FL — SIGNIFICANT CHANGE UP (ref 80–100)
MCV RBC AUTO: 85.4 FL — SIGNIFICANT CHANGE UP (ref 80–100)
MCV RBC AUTO: 85.6 FL — SIGNIFICANT CHANGE UP (ref 80–100)
MCV RBC AUTO: 87.3 FL — SIGNIFICANT CHANGE UP (ref 80–100)
NRBC # BLD: 0 /100 WBCS — SIGNIFICANT CHANGE UP (ref 0–0)
NRBC # FLD: 0 K/UL — SIGNIFICANT CHANGE UP (ref 0–0)
PA ADP PRP-ACNC: 314 PRU — SIGNIFICANT CHANGE UP (ref 194–418)
PHOSPHATE SERPL-MCNC: 2.2 MG/DL — LOW (ref 2.5–4.5)
PHOSPHATE SERPL-MCNC: 2.5 MG/DL — SIGNIFICANT CHANGE UP (ref 2.5–4.5)
PHOSPHATE SERPL-MCNC: 3.1 MG/DL — SIGNIFICANT CHANGE UP (ref 2.5–4.5)
PLATELET # BLD AUTO: 141 K/UL — LOW (ref 150–400)
PLATELET # BLD AUTO: 143 K/UL — LOW (ref 150–400)
PLATELET # BLD AUTO: 150 K/UL — SIGNIFICANT CHANGE UP (ref 150–400)
PLATELET # BLD AUTO: 152 K/UL — SIGNIFICANT CHANGE UP (ref 150–400)
POTASSIUM SERPL-MCNC: 3.7 MMOL/L — SIGNIFICANT CHANGE UP (ref 3.5–5.3)
POTASSIUM SERPL-MCNC: 3.8 MMOL/L — SIGNIFICANT CHANGE UP (ref 3.5–5.3)
POTASSIUM SERPL-MCNC: 4.1 MMOL/L — SIGNIFICANT CHANGE UP (ref 3.5–5.3)
POTASSIUM SERPL-SCNC: 3.7 MMOL/L — SIGNIFICANT CHANGE UP (ref 3.5–5.3)
POTASSIUM SERPL-SCNC: 3.8 MMOL/L — SIGNIFICANT CHANGE UP (ref 3.5–5.3)
POTASSIUM SERPL-SCNC: 4.1 MMOL/L — SIGNIFICANT CHANGE UP (ref 3.5–5.3)
PROTHROM AB SERPL-ACNC: 14 SEC — HIGH (ref 10.5–13.4)
PROTHROM AB SERPL-ACNC: 16.3 SEC — HIGH (ref 10.5–13.4)
RBC # BLD: 3.15 M/UL — LOW (ref 4.2–5.8)
RBC # BLD: 3.53 M/UL — LOW (ref 4.2–5.8)
RBC # BLD: 3.61 M/UL — LOW (ref 4.2–5.8)
RBC # BLD: 3.62 M/UL — LOW (ref 4.2–5.8)
RBC # FLD: 13.8 % — SIGNIFICANT CHANGE UP (ref 10.3–14.5)
RBC # FLD: 13.8 % — SIGNIFICANT CHANGE UP (ref 10.3–14.5)
RBC # FLD: 13.9 % — SIGNIFICANT CHANGE UP (ref 10.3–14.5)
RBC # FLD: 13.9 % — SIGNIFICANT CHANGE UP (ref 10.3–14.5)
SODIUM SERPL-SCNC: 140 MMOL/L — SIGNIFICANT CHANGE UP (ref 135–145)
SODIUM SERPL-SCNC: 141 MMOL/L — SIGNIFICANT CHANGE UP (ref 135–145)
SODIUM SERPL-SCNC: 142 MMOL/L — SIGNIFICANT CHANGE UP (ref 135–145)
WBC # BLD: 11.07 K/UL — HIGH (ref 3.8–10.5)
WBC # BLD: 7.96 K/UL — SIGNIFICANT CHANGE UP (ref 3.8–10.5)
WBC # BLD: 8.72 K/UL — SIGNIFICANT CHANGE UP (ref 3.8–10.5)
WBC # BLD: 9.75 K/UL — SIGNIFICANT CHANGE UP (ref 3.8–10.5)
WBC # FLD AUTO: 11.07 K/UL — HIGH (ref 3.8–10.5)
WBC # FLD AUTO: 7.96 K/UL — SIGNIFICANT CHANGE UP (ref 3.8–10.5)
WBC # FLD AUTO: 8.72 K/UL — SIGNIFICANT CHANGE UP (ref 3.8–10.5)
WBC # FLD AUTO: 9.75 K/UL — SIGNIFICANT CHANGE UP (ref 3.8–10.5)

## 2023-05-08 PROCEDURE — 71045 X-RAY EXAM CHEST 1 VIEW: CPT | Mod: 26,77

## 2023-05-08 PROCEDURE — 71045 X-RAY EXAM CHEST 1 VIEW: CPT | Mod: 26

## 2023-05-08 PROCEDURE — 99233 SBSQ HOSP IP/OBS HIGH 50: CPT

## 2023-05-08 RX ORDER — SODIUM,POTASSIUM PHOSPHATES 278-250MG
1 POWDER IN PACKET (EA) ORAL ONCE
Refills: 0 | Status: DISCONTINUED | OUTPATIENT
Start: 2023-05-08 | End: 2023-05-08

## 2023-05-08 RX ORDER — INSULIN GLARGINE 100 [IU]/ML
34 INJECTION, SOLUTION SUBCUTANEOUS EVERY MORNING
Refills: 0 | Status: DISCONTINUED | OUTPATIENT
Start: 2023-05-08 | End: 2023-05-09

## 2023-05-08 RX ORDER — ALBUTEROL 90 UG/1
2.5 AEROSOL, METERED ORAL EVERY 6 HOURS
Refills: 0 | Status: DISCONTINUED | OUTPATIENT
Start: 2023-05-08 | End: 2023-05-14

## 2023-05-08 RX ORDER — INSULIN LISPRO 100/ML
VIAL (ML) SUBCUTANEOUS AT BEDTIME
Refills: 0 | Status: DISCONTINUED | OUTPATIENT
Start: 2023-05-08 | End: 2023-05-11

## 2023-05-08 RX ORDER — PHYTONADIONE (VIT K1) 5 MG
10 TABLET ORAL ONCE
Refills: 0 | Status: COMPLETED | OUTPATIENT
Start: 2023-05-08 | End: 2023-05-08

## 2023-05-08 RX ORDER — INSULIN LISPRO 100/ML
VIAL (ML) SUBCUTANEOUS
Refills: 0 | Status: DISCONTINUED | OUTPATIENT
Start: 2023-05-08 | End: 2023-05-11

## 2023-05-08 RX ORDER — SODIUM CHLORIDE 9 MG/ML
4 INJECTION INTRAMUSCULAR; INTRAVENOUS; SUBCUTANEOUS EVERY 6 HOURS
Refills: 0 | Status: DISCONTINUED | OUTPATIENT
Start: 2023-05-08 | End: 2023-05-14

## 2023-05-08 RX ORDER — DORNASE ALFA 1 MG/ML
2.5 SOLUTION RESPIRATORY (INHALATION) DAILY
Refills: 0 | Status: DISCONTINUED | OUTPATIENT
Start: 2023-05-08 | End: 2023-05-14

## 2023-05-08 RX ORDER — POTASSIUM CHLORIDE 20 MEQ
20 PACKET (EA) ORAL ONCE
Refills: 0 | Status: COMPLETED | OUTPATIENT
Start: 2023-05-08 | End: 2023-05-08

## 2023-05-08 RX ORDER — INSULIN GLARGINE 100 [IU]/ML
30 INJECTION, SOLUTION SUBCUTANEOUS EVERY MORNING
Refills: 0 | Status: DISCONTINUED | OUTPATIENT
Start: 2023-05-08 | End: 2023-05-08

## 2023-05-08 RX ORDER — INSULIN GLARGINE 100 [IU]/ML
30 INJECTION, SOLUTION SUBCUTANEOUS AT BEDTIME
Refills: 0 | Status: DISCONTINUED | OUTPATIENT
Start: 2023-05-08 | End: 2023-05-08

## 2023-05-08 RX ORDER — INSULIN LISPRO 100/ML
18 VIAL (ML) SUBCUTANEOUS
Refills: 0 | Status: DISCONTINUED | OUTPATIENT
Start: 2023-05-08 | End: 2023-05-09

## 2023-05-08 RX ORDER — SODIUM,POTASSIUM PHOSPHATES 278-250MG
2 POWDER IN PACKET (EA) ORAL EVERY 6 HOURS
Refills: 0 | Status: DISCONTINUED | OUTPATIENT
Start: 2023-05-08 | End: 2023-05-08

## 2023-05-08 RX ORDER — ACETAMINOPHEN 500 MG
1000 TABLET ORAL ONCE
Refills: 0 | Status: COMPLETED | OUTPATIENT
Start: 2023-05-08 | End: 2023-05-08

## 2023-05-08 RX ORDER — ACETAMINOPHEN 500 MG
1000 TABLET ORAL ONCE
Refills: 0 | Status: DISCONTINUED | OUTPATIENT
Start: 2023-05-08 | End: 2023-05-14

## 2023-05-08 RX ADMIN — DORNASE ALFA 2.5 MILLIGRAM(S): 1 SOLUTION RESPIRATORY (INHALATION) at 11:42

## 2023-05-08 RX ADMIN — FINASTERIDE 5 MILLIGRAM(S): 5 TABLET, FILM COATED ORAL at 12:03

## 2023-05-08 RX ADMIN — LIDOCAINE 1 PATCH: 4 CREAM TOPICAL at 23:50

## 2023-05-08 RX ADMIN — LIDOCAINE 1 PATCH: 4 CREAM TOPICAL at 19:17

## 2023-05-08 RX ADMIN — Medication 1000 MILLIGRAM(S): at 11:05

## 2023-05-08 RX ADMIN — INSULIN GLARGINE 30 UNIT(S): 100 INJECTION, SOLUTION SUBCUTANEOUS at 10:26

## 2023-05-08 RX ADMIN — OXYCODONE HYDROCHLORIDE 5 MILLIGRAM(S): 5 TABLET ORAL at 05:30

## 2023-05-08 RX ADMIN — Medication 10 MILLIGRAM(S): at 17:01

## 2023-05-08 RX ADMIN — SODIUM CHLORIDE 4 MILLILITER(S): 9 INJECTION INTRAMUSCULAR; INTRAVENOUS; SUBCUTANEOUS at 21:59

## 2023-05-08 RX ADMIN — SODIUM CHLORIDE 4 MILLILITER(S): 9 INJECTION INTRAMUSCULAR; INTRAVENOUS; SUBCUTANEOUS at 11:41

## 2023-05-08 RX ADMIN — Medication 15 UNIT(S): at 07:42

## 2023-05-08 RX ADMIN — Medication 400 MILLIGRAM(S): at 01:45

## 2023-05-08 RX ADMIN — OXYCODONE HYDROCHLORIDE 5 MILLIGRAM(S): 5 TABLET ORAL at 17:00

## 2023-05-08 RX ADMIN — Medication 15 UNIT(S): at 12:07

## 2023-05-08 RX ADMIN — LIDOCAINE 1 PATCH: 4 CREAM TOPICAL at 00:04

## 2023-05-08 RX ADMIN — SENNA PLUS 2 TABLET(S): 8.6 TABLET ORAL at 21:40

## 2023-05-08 RX ADMIN — Medication 400 MILLIGRAM(S): at 10:37

## 2023-05-08 RX ADMIN — ALBUTEROL 2.5 MILLIGRAM(S): 90 AEROSOL, METERED ORAL at 21:59

## 2023-05-08 RX ADMIN — Medication 2 TABLET(S): at 18:01

## 2023-05-08 RX ADMIN — CARVEDILOL PHOSPHATE 12.5 MILLIGRAM(S): 80 CAPSULE, EXTENDED RELEASE ORAL at 17:24

## 2023-05-08 RX ADMIN — CARVEDILOL PHOSPHATE 12.5 MILLIGRAM(S): 80 CAPSULE, EXTENDED RELEASE ORAL at 05:41

## 2023-05-08 RX ADMIN — Medication 15 UNIT(S): at 16:52

## 2023-05-08 RX ADMIN — Medication 3: at 07:41

## 2023-05-08 RX ADMIN — LIDOCAINE 1 PATCH: 4 CREAM TOPICAL at 12:03

## 2023-05-08 RX ADMIN — POLYETHYLENE GLYCOL 3350 17 GRAM(S): 17 POWDER, FOR SOLUTION ORAL at 12:03

## 2023-05-08 RX ADMIN — Medication 102 MILLIGRAM(S): at 09:44

## 2023-05-08 RX ADMIN — Medication 81 MILLIGRAM(S): at 12:03

## 2023-05-08 RX ADMIN — OXYCODONE HYDROCHLORIDE 5 MILLIGRAM(S): 5 TABLET ORAL at 17:30

## 2023-05-08 RX ADMIN — SIMVASTATIN 20 MILLIGRAM(S): 20 TABLET, FILM COATED ORAL at 21:40

## 2023-05-08 RX ADMIN — Medication 20 MILLIEQUIVALENT(S): at 17:01

## 2023-05-08 RX ADMIN — ALBUTEROL 2.5 MILLIGRAM(S): 90 AEROSOL, METERED ORAL at 11:41

## 2023-05-08 RX ADMIN — Medication 4: at 12:08

## 2023-05-08 RX ADMIN — ALBUTEROL 2.5 MILLIGRAM(S): 90 AEROSOL, METERED ORAL at 15:40

## 2023-05-08 RX ADMIN — Medication 1000 MILLIGRAM(S): at 20:15

## 2023-05-08 RX ADMIN — Medication 400 MILLIGRAM(S): at 20:00

## 2023-05-08 RX ADMIN — OXYCODONE HYDROCHLORIDE 5 MILLIGRAM(S): 5 TABLET ORAL at 06:30

## 2023-05-08 RX ADMIN — SODIUM CHLORIDE 4 MILLILITER(S): 9 INJECTION INTRAMUSCULAR; INTRAVENOUS; SUBCUTANEOUS at 15:40

## 2023-05-08 RX ADMIN — Medication 4: at 16:53

## 2023-05-08 NOTE — PHYSICAL THERAPY INITIAL EVALUATION ADULT - GENERAL OBSERVATIONS, REHAB EVAL
Pt received sitting in bedside chair, +monitor lines, all lines/tubes intact, NAD. HR: 90 beats per minute, oxygen saturation: 95% on room air, RNAudelia, present throughout

## 2023-05-08 NOTE — PHYSICAL THERAPY INITIAL EVALUATION ADULT - GROSSLY INTACT, SENSORY
Pt stated he occasionally feels tingling in bilateral hands; however light touch sensation intact/Grossly Intact

## 2023-05-08 NOTE — PHYSICAL THERAPY INITIAL EVALUATION ADULT - MANUAL MUSCLE TESTING RESULTS, REHAB EVAL
right upper extremity: grossly at least 3/5, left upper extremity: not tested due to rib fractures, bilateral lower extremities: at least 3/5/grossly assessed due to

## 2023-05-08 NOTE — PHYSICAL THERAPY INITIAL EVALUATION ADULT - ADDITIONAL COMMENTS
Pt lives with his wife in a house with 0 stairs to enter; Pt resides on the first floor. prior to admission Pt was independent with all mobility and ambulated with a straight cane. Pt denies any falls over the last year. Pt owns a straight cane and a rolling walker.     Pt. left comfortable in bedside chair with all tubes/lines intact, call bell in reach and in NAD.

## 2023-05-08 NOTE — PHYSICAL THERAPY INITIAL EVALUATION ADULT - PERTINENT HX OF CURRENT PROBLEM, REHAB EVAL
Pt is an 82 year old male who presented to the hospital status post mechanical fall at home and now has multiple left sided rib fractures (7-10) and a moderate sized left sided hemothorax. Potential OR later this week for LVATS.

## 2023-05-08 NOTE — PHYSICAL THERAPY INITIAL EVALUATION ADULT - RANGE OF MOTION EXAMINATION, REHAB EVAL
right upper extremity: within functional limits, left upper extremity: shoulder flexion to 90 degrees at least; not further tested due to rib fractures/bilateral lower extremity ROM was WFL (within functional limits)

## 2023-05-08 NOTE — PHYSICAL THERAPY INITIAL EVALUATION ADULT - NSPTDISCHREC_GEN_A_CORE
anticipated discharge to home with home PT services to address current functional limitations to optimize safety within the home environment with the use of a rolling walker (pt owns)/Home PT

## 2023-05-08 NOTE — PROGRESS NOTE ADULT - SUBJECTIVE AND OBJECTIVE BOX
CHIEF COMPLAINT: FOLLOW UP IN ICU OF PATIENT WITH HEMOTHORAX      ISSUES:   Acute blood loss anemia  Left hemothorax  NAIN  Left 7-10 rib fractures  Pulmonary contusion  Uncontrolled hyperglycemia  CAD with stents ~13y ago on ASA and plavix  HTN  DM2  Hyperlipidemia  BPH  Glaucoma      INTERVAL EVENTS:   Hemoglobin stable.  Hemodynamic stable.  Plavix P2Y12 level remains elevated.          HISTORY:   Patient reports moderate pain at chest wall which is worse with coughing and deep breathing without associated fever or dyspnea. Pain is improved with use of pain meds. Denies headache or lightheadedness.    PHYSICAL EXAM:   Gen: Comfortable, No acute distress  Eyes: Sclera white, Conjunctiva normal, Eyelids normal, Pupils symmetrical   ENT: Mucous membranes moist,  ,  ,    Neck: Trachea midline,  ,  ,  ,  ,  ,    CV: Rate regular, Rhythm regular,  ,  ,    Resp: Breath sounds clear, No accessory muscles use, L chest wall tenderness.  Abd: Soft, Non-distended, Non-tender, Bowel sounds normal,  ,  ,    Skin: Warm, No peripheral edema of lower extremities,  ,    : No briscoe  Neuro: Moving all 4 extremities,    Psych: A&Ox3      ASSESSMENT AND PLAN:     NEURO:  Pain from Rib fractures - Stable. Pain control with oxycodone and Tylenol              RESPIRATORY:  Hypoxia - Wean nasal cannula for goal O2sat above 92. Obtain CXR. Incentive spirometry. Chest PT and frequent suctioning. Continue bronchodilators. OOB to chair & ambulate w/ assistance. Continuous pulse oximetry for support & to prevent decompensation.       CARDIOVASCULAR:  Hemodynamically stable - Not on pressors. Continue hemodynamic monitoring.  Telemetry (medical test) - Reviewed by me today independently. Normal sinus rhythm.    CAD s/p stents (in 2010) - stable. Continue aspirin. hold plavix for surgery.      HTN - stable. continue carvedilol.              RENAL:  NAIN – Worsening. Given IVF. Renally dose medications. Monitor for hyperkalemia and uremia. Avoid nephrotoxic medications. Monitor IOs and electrolytes.          GASTROINTESTINAL:  GI prophylaxis not indicated  Zofran and Reglan IV PRN for nausea  Regular consistency diet             HEMATOLOGIC:  Acute blood loss anemia secondary to hemothorax - Monitor CBC. Transfuse PRBC as needed. Monitor chest tube output.  Hemothorax - worsening. Awaiting drainage and decortication. Monitor CXR.  Pre-op clearance by Cardiology  Repeat CBC    DVT prophylaxis with SCDs, holding off heparin due to hemothorax.           INFECTIOUS DISEASE:  All surgical sites appear clean. No signs of active infection. Will monitor for fever and leukocytosis.               ENDOCRINE:  DM2 – Stable. Monitor glucose fingersticks for goal 120-180. Lantus. Insulin sliding scale. Carb control diet.            Pertinent clinical, laboratory, radiographic, hemodynamic, echocardiographic, respiratory data, microbiologic data and chart were reviewed by myself and analyzed frequently throughout the course of the day and night by myself.    Plan discussed at length with the CTICU staff and Attending CT Surgeon -   Dr Mathieu Hassan.    Patient's status was discussed with patient at bedside.    ________________________________________________      _________________________  VITAL SIGNS:  Vital Signs Last 24 Hrs  T(C): 37 (08 May 2023 08:00), Max: 37 (08 May 2023 08:00)  T(F): 98.6 (08 May 2023 08:00), Max: 98.6 (08 May 2023 08:00)  HR: 77 (08 May 2023 10:00) (65 - 95)  BP: 149/62 (08 May 2023 10:00) (106/79 - 152/60)  BP(mean): 86 (08 May 2023 10:00) (67 - 93)  RR: 20 (08 May 2023 10:00) (13 - 26)  SpO2: 96% (08 May 2023 10:00) (91% - 100%)    Parameters below as of 08 May 2023 10:00  Patient On (Oxygen Delivery Method): nasal cannula w/ humidification  O2 Flow (L/min): 2    I/Os:   I&O's Detail    07 May 2023 07:01  -  08 May 2023 07:00  --------------------------------------------------------  IN:    IV PiggyBack: 250 mL    Lactated Ringers: 2100 mL    Lactated Ringers Bolus: 750 mL  Total IN: 3100 mL    OUT:    Voided (mL): 1325 mL  Total OUT: 1325 mL    Total NET: 1775 mL      08 May 2023 07:01  -  08 May 2023 10:38  --------------------------------------------------------  IN:    Oral Fluid: 360 mL  Total IN: 360 mL    OUT:  Total OUT: 0 mL    Total NET: 360 mL              MEDICATIONS:  MEDICATIONS  (STANDING):  albuterol    0.083% 2.5 milliGRAM(s) Nebulizer every 6 hours  aspirin enteric coated 81 milliGRAM(s) Oral daily  carvedilol 12.5 milliGRAM(s) Oral every 12 hours  dextrose 50% Injectable 25 Gram(s) IV Push once  dornase samm Solution 2.5 milliGRAM(s) Inhalation daily  finasteride 5 milliGRAM(s) Oral daily  insulin glargine Injectable (LANTUS) 30 Unit(s) SubCutaneous every morning  insulin lispro (ADMELOG) corrective regimen sliding scale   SubCutaneous three times a day before meals  insulin lispro Injectable (ADMELOG) 15 Unit(s) SubCutaneous three times a day before meals  lidocaine   4% Patch 1 Patch Transdermal daily  polyethylene glycol 3350 17 Gram(s) Oral daily  senna 2 Tablet(s) Oral at bedtime  simvastatin 20 milliGRAM(s) Oral at bedtime  sodium chloride 3%  Inhalation 4 milliLiter(s) Inhalation every 6 hours    MEDICATIONS  (PRN):  oxyCODONE    IR 10 milliGRAM(s) Oral every 8 hours PRN Severe Pain (7 - 10)  oxyCODONE    IR 5 milliGRAM(s) Oral every 6 hours PRN Moderate Pain (4 - 6)      LABS:  Laboratory data was independently reviewed by me today.                           9.4    7.96  )-----------( 152      ( 08 May 2023 06:00 )             30.1     05-08    141  |  105  |  36<H>  ----------------------------<  263<H>  4.1   |  25  |  1.51<H>    Ca    8.9      08 May 2023 04:53  Phos  3.1     05-08  Mg     2.30     05-08    TPro  6.2  /  Alb  4.2  /  TBili  0.6  /  DBili  x   /  AST  23  /  ALT  19  /  AlkPhos  84  05-06    LIVER FUNCTIONS - ( 06 May 2023 22:15 )  Alb: 4.2 g/dL / Pro: 6.2 g/dL / ALK PHOS: 84 U/L / ALT: 19 U/L / AST: 23 U/L / GGT: x           PT/INR - ( 08 May 2023 04:53 )   PT: 16.3 sec;   INR: 1.40 ratio         PTT - ( 08 May 2023 04:53 )  PTT:23.9 sec        RADIOLOGY:   Radiology images were independently reviewed by me today. Reports were reviewed by me today.    Xray Chest 2 Views PA/Lat:   ACC: [id number] EXAM:  XR CHEST PA LAT 2V   ORDERED BY: REGI TORRES     PROCEDURE DATE:  05/06/2023          INTERPRETATION:  EXAMINATION: XR CHEST PA AND LATERAL    CLINICAL INDICATION: Left chest wall tenderness.    TECHNIQUE: 2 views; Frontal and lateral views of the chest were obtained.    COMPARISON: None.    FINDINGS:  The heart is normal in size.  Hazy left opacity reflecting large left pleural effusion with atelectasis.  Right lung is clear. No pneumothorax.    IMPRESSION:  Large left pleural effusion with atelectasis.    --- End of Report ---          MARIAH GOLD MD; Resident Radiologist  This document has been electronically signed.  FREDDY WISE MD; Attending Radiologist  This document has been electronically signed. May  7 2023 7:57AM (05-06-23 @ 23:02)     CHIEF COMPLAINT: FOLLOW UP IN ICU OF PATIENT WITH HEMOTHORAX      ISSUES:   Acute blood loss anemia  Left hemothorax  NAIN  Left 7-10 rib fractures  Pulmonary contusion  Uncontrolled hyperglycemia  CAD with stents ~13y ago on ASA and plavix  HTN  DM2  Hyperlipidemia  BPH  Glaucoma      INTERVAL EVENTS:   Hemoglobin stable.  Hemodynamic stable.  Plavix P2Y12 level remains elevated.          HISTORY:   Patient reports moderate pain at chest wall which is worse with coughing and deep breathing without associated fever or dyspnea. Pain is improved with use of pain meds. Denies headache or lightheadedness.    PHYSICAL EXAM:   Gen: Comfortable, No acute distress  Eyes: Sclera white, Conjunctiva normal, Eyelids normal, Pupils symmetrical   ENT: Mucous membranes moist,  ,  ,    Neck: Trachea midline,  ,  ,  ,  ,  ,    CV: Rate regular, Rhythm regular,  ,  ,    Resp: Breath sounds clear, No accessory muscles use, L chest wall tenderness.  Abd: Soft, Non-distended, Non-tender, Bowel sounds normal,  ,  ,    Skin: Warm, No peripheral edema of lower extremities,  ,    : No briscoe  Neuro: Moving all 4 extremities,    Psych: A&Ox3      ASSESSMENT AND PLAN:     NEURO:  Pain from Rib fractures - Stable. Pain control with oxycodone and Tylenol              RESPIRATORY:  Hypoxia - Wean nasal cannula for goal O2sat above 92. Obtain CXR. Incentive spirometry. Chest PT and frequent suctioning. Continue bronchodilators. OOB to chair & ambulate w/ assistance. Continuous pulse oximetry for support & to prevent decompensation.       CARDIOVASCULAR:  Hemodynamically stable - Not on pressors. Continue hemodynamic monitoring.  Telemetry (medical test) - Reviewed by me today independently. Normal sinus rhythm.    CAD s/p stents (in 2010) - stable. Continue aspirin. hold plavix for surgery.      HTN - stable. continue carvedilol.              RENAL:  NAIN – Worsening. Given IVF. Renally dose medications. Monitor for hyperkalemia and uremia. Avoid nephrotoxic medications. Monitor IOs and electrolytes.     BPH - stable.        GASTROINTESTINAL:  GI prophylaxis not indicated  Zofran and Reglan IV PRN for nausea  Regular consistency diet             HEMATOLOGIC:  Acute blood loss anemia secondary to hemothorax - Monitor CBC. Transfuse PRBC as needed. Monitor chest tube output.  Hemothorax - worsening. Awaiting drainage and decortication. Monitor CXR.  Pre-op clearance by Cardiology  Repeat CBC    DVT prophylaxis with SCDs, holding off heparin due to hemothorax.           INFECTIOUS DISEASE:  All surgical sites appear clean. No signs of active infection. Will monitor for fever and leukocytosis.               ENDOCRINE:  DM2 – Stable. Monitor glucose fingersticks for goal 120-180. Lantus. Insulin sliding scale. Carb control diet.            Pertinent clinical, laboratory, radiographic, hemodynamic, echocardiographic, respiratory data, microbiologic data and chart were reviewed by myself and analyzed frequently throughout the course of the day and night by myself.    Plan discussed at length with the CTICU staff and Attending CT Surgeon -   Dr Mathieu Hassan.    Patient's status was discussed with patient at bedside.    ________________________________________________      _________________________  VITAL SIGNS:  Vital Signs Last 24 Hrs  T(C): 37 (08 May 2023 08:00), Max: 37 (08 May 2023 08:00)  T(F): 98.6 (08 May 2023 08:00), Max: 98.6 (08 May 2023 08:00)  HR: 77 (08 May 2023 10:00) (65 - 95)  BP: 149/62 (08 May 2023 10:00) (106/79 - 152/60)  BP(mean): 86 (08 May 2023 10:00) (67 - 93)  RR: 20 (08 May 2023 10:00) (13 - 26)  SpO2: 96% (08 May 2023 10:00) (91% - 100%)    Parameters below as of 08 May 2023 10:00  Patient On (Oxygen Delivery Method): nasal cannula w/ humidification  O2 Flow (L/min): 2    I/Os:   I&O's Detail    07 May 2023 07:01  -  08 May 2023 07:00  --------------------------------------------------------  IN:    IV PiggyBack: 250 mL    Lactated Ringers: 2100 mL    Lactated Ringers Bolus: 750 mL  Total IN: 3100 mL    OUT:    Voided (mL): 1325 mL  Total OUT: 1325 mL    Total NET: 1775 mL      08 May 2023 07:01  -  08 May 2023 10:38  --------------------------------------------------------  IN:    Oral Fluid: 360 mL  Total IN: 360 mL    OUT:  Total OUT: 0 mL    Total NET: 360 mL              MEDICATIONS:  MEDICATIONS  (STANDING):  albuterol    0.083% 2.5 milliGRAM(s) Nebulizer every 6 hours  aspirin enteric coated 81 milliGRAM(s) Oral daily  carvedilol 12.5 milliGRAM(s) Oral every 12 hours  dextrose 50% Injectable 25 Gram(s) IV Push once  dornase samm Solution 2.5 milliGRAM(s) Inhalation daily  finasteride 5 milliGRAM(s) Oral daily  insulin glargine Injectable (LANTUS) 30 Unit(s) SubCutaneous every morning  insulin lispro (ADMELOG) corrective regimen sliding scale   SubCutaneous three times a day before meals  insulin lispro Injectable (ADMELOG) 15 Unit(s) SubCutaneous three times a day before meals  lidocaine   4% Patch 1 Patch Transdermal daily  polyethylene glycol 3350 17 Gram(s) Oral daily  senna 2 Tablet(s) Oral at bedtime  simvastatin 20 milliGRAM(s) Oral at bedtime  sodium chloride 3%  Inhalation 4 milliLiter(s) Inhalation every 6 hours    MEDICATIONS  (PRN):  oxyCODONE    IR 10 milliGRAM(s) Oral every 8 hours PRN Severe Pain (7 - 10)  oxyCODONE    IR 5 milliGRAM(s) Oral every 6 hours PRN Moderate Pain (4 - 6)      LABS:  Laboratory data was independently reviewed by me today.                           9.4    7.96  )-----------( 152      ( 08 May 2023 06:00 )             30.1     05-08    141  |  105  |  36<H>  ----------------------------<  263<H>  4.1   |  25  |  1.51<H>    Ca    8.9      08 May 2023 04:53  Phos  3.1     05-08  Mg     2.30     05-08    TPro  6.2  /  Alb  4.2  /  TBili  0.6  /  DBili  x   /  AST  23  /  ALT  19  /  AlkPhos  84  05-06    LIVER FUNCTIONS - ( 06 May 2023 22:15 )  Alb: 4.2 g/dL / Pro: 6.2 g/dL / ALK PHOS: 84 U/L / ALT: 19 U/L / AST: 23 U/L / GGT: x           PT/INR - ( 08 May 2023 04:53 )   PT: 16.3 sec;   INR: 1.40 ratio         PTT - ( 08 May 2023 04:53 )  PTT:23.9 sec        RADIOLOGY:   Radiology images were independently reviewed by me today. Reports were reviewed by me today.    Xray Chest 2 Views PA/Lat:   ACC: [id number] EXAM:  XR CHEST PA LAT 2V   ORDERED BY: REGI TORRES     PROCEDURE DATE:  05/06/2023          INTERPRETATION:  EXAMINATION: XR CHEST PA AND LATERAL    CLINICAL INDICATION: Left chest wall tenderness.    TECHNIQUE: 2 views; Frontal and lateral views of the chest were obtained.    COMPARISON: None.    FINDINGS:  The heart is normal in size.  Hazy left opacity reflecting large left pleural effusion with atelectasis.  Right lung is clear. No pneumothorax.    IMPRESSION:  Large left pleural effusion with atelectasis.    --- End of Report ---          MARIAH GOLD MD; Resident Radiologist  This document has been electronically signed.  FREDDY WISE MD; Attending Radiologist  This document has been electronically signed. May  7 2023 7:57AM (05-06-23 @ 23:02)

## 2023-05-09 PROBLEM — I10 ESSENTIAL (PRIMARY) HYPERTENSION: Chronic | Status: ACTIVE | Noted: 2023-05-07

## 2023-05-09 PROBLEM — E78.5 HYPERLIPIDEMIA, UNSPECIFIED: Chronic | Status: ACTIVE | Noted: 2023-05-07

## 2023-05-09 PROBLEM — N40.0 BENIGN PROSTATIC HYPERPLASIA WITHOUT LOWER URINARY TRACT SYMPTOMS: Chronic | Status: ACTIVE | Noted: 2023-05-07

## 2023-05-09 PROBLEM — H40.9 UNSPECIFIED GLAUCOMA: Chronic | Status: ACTIVE | Noted: 2023-05-07

## 2023-05-09 PROBLEM — I25.10 ATHEROSCLEROTIC HEART DISEASE OF NATIVE CORONARY ARTERY WITHOUT ANGINA PECTORIS: Chronic | Status: ACTIVE | Noted: 2023-05-07

## 2023-05-09 PROBLEM — E11.9 TYPE 2 DIABETES MELLITUS WITHOUT COMPLICATIONS: Chronic | Status: ACTIVE | Noted: 2023-05-07

## 2023-05-09 LAB
A1C WITH ESTIMATED AVERAGE GLUCOSE RESULT: 7.5 % — HIGH (ref 4–5.6)
ANION GAP SERPL CALC-SCNC: 11 MMOL/L — SIGNIFICANT CHANGE UP (ref 7–14)
APTT BLD: 23 SEC — LOW (ref 27–36.3)
BUN SERPL-MCNC: 29 MG/DL — HIGH (ref 7–23)
CALCIUM SERPL-MCNC: 8.7 MG/DL — SIGNIFICANT CHANGE UP (ref 8.4–10.5)
CHLORIDE SERPL-SCNC: 107 MMOL/L — SIGNIFICANT CHANGE UP (ref 98–107)
CO2 SERPL-SCNC: 24 MMOL/L — SIGNIFICANT CHANGE UP (ref 22–31)
CREAT SERPL-MCNC: 1.43 MG/DL — HIGH (ref 0.5–1.3)
EGFR: 49 ML/MIN/1.73M2 — LOW
ESTIMATED AVERAGE GLUCOSE: 169 — SIGNIFICANT CHANGE UP
GLUCOSE BLDC GLUCOMTR-MCNC: 112 MG/DL — HIGH (ref 70–99)
GLUCOSE BLDC GLUCOMTR-MCNC: 116 MG/DL — HIGH (ref 70–99)
GLUCOSE BLDC GLUCOMTR-MCNC: 181 MG/DL — HIGH (ref 70–99)
GLUCOSE BLDC GLUCOMTR-MCNC: 207 MG/DL — HIGH (ref 70–99)
GLUCOSE BLDC GLUCOMTR-MCNC: 232 MG/DL — HIGH (ref 70–99)
GLUCOSE SERPL-MCNC: 124 MG/DL — HIGH (ref 70–99)
HCT VFR BLD CALC: 26.4 % — LOW (ref 39–50)
HCT VFR BLD CALC: 29.5 % — LOW (ref 39–50)
HGB BLD-MCNC: 8.4 G/DL — LOW (ref 13–17)
HGB BLD-MCNC: 9.2 G/DL — LOW (ref 13–17)
INR BLD: 1.3 RATIO — HIGH (ref 0.88–1.16)
MAGNESIUM SERPL-MCNC: 2.2 MG/DL — SIGNIFICANT CHANGE UP (ref 1.6–2.6)
MCHC RBC-ENTMCNC: 27 PG — SIGNIFICANT CHANGE UP (ref 27–34)
MCHC RBC-ENTMCNC: 27.5 PG — SIGNIFICANT CHANGE UP (ref 27–34)
MCHC RBC-ENTMCNC: 31.2 GM/DL — LOW (ref 32–36)
MCHC RBC-ENTMCNC: 31.8 GM/DL — LOW (ref 32–36)
MCV RBC AUTO: 86.3 FL — SIGNIFICANT CHANGE UP (ref 80–100)
MCV RBC AUTO: 86.5 FL — SIGNIFICANT CHANGE UP (ref 80–100)
NRBC # BLD: 0 /100 WBCS — SIGNIFICANT CHANGE UP (ref 0–0)
NRBC # BLD: 0 /100 WBCS — SIGNIFICANT CHANGE UP (ref 0–0)
NRBC # FLD: 0 K/UL — SIGNIFICANT CHANGE UP (ref 0–0)
NRBC # FLD: 0 K/UL — SIGNIFICANT CHANGE UP (ref 0–0)
PA ADP PRP-ACNC: 327 PRU — SIGNIFICANT CHANGE UP (ref 194–418)
PHOSPHATE SERPL-MCNC: 3.4 MG/DL — SIGNIFICANT CHANGE UP (ref 2.5–4.5)
PLATELET # BLD AUTO: 135 K/UL — LOW (ref 150–400)
PLATELET # BLD AUTO: 139 K/UL — LOW (ref 150–400)
POTASSIUM SERPL-MCNC: 4.2 MMOL/L — SIGNIFICANT CHANGE UP (ref 3.5–5.3)
POTASSIUM SERPL-SCNC: 4.2 MMOL/L — SIGNIFICANT CHANGE UP (ref 3.5–5.3)
PROTHROM AB SERPL-ACNC: 15.1 SEC — HIGH (ref 10.5–13.4)
RBC # BLD: 3.06 M/UL — LOW (ref 4.2–5.8)
RBC # BLD: 3.41 M/UL — LOW (ref 4.2–5.8)
RBC # FLD: 14.1 % — SIGNIFICANT CHANGE UP (ref 10.3–14.5)
RBC # FLD: 14.1 % — SIGNIFICANT CHANGE UP (ref 10.3–14.5)
SODIUM SERPL-SCNC: 142 MMOL/L — SIGNIFICANT CHANGE UP (ref 135–145)
WBC # BLD: 9.22 K/UL — SIGNIFICANT CHANGE UP (ref 3.8–10.5)
WBC # BLD: 9.85 K/UL — SIGNIFICANT CHANGE UP (ref 3.8–10.5)
WBC # FLD AUTO: 9.22 K/UL — SIGNIFICANT CHANGE UP (ref 3.8–10.5)
WBC # FLD AUTO: 9.85 K/UL — SIGNIFICANT CHANGE UP (ref 3.8–10.5)

## 2023-05-09 PROCEDURE — 71045 X-RAY EXAM CHEST 1 VIEW: CPT | Mod: 26

## 2023-05-09 PROCEDURE — 99222 1ST HOSP IP/OBS MODERATE 55: CPT

## 2023-05-09 PROCEDURE — 99233 SBSQ HOSP IP/OBS HIGH 50: CPT

## 2023-05-09 RX ORDER — INSULIN GLARGINE 100 [IU]/ML
20 INJECTION, SOLUTION SUBCUTANEOUS EVERY MORNING
Refills: 0 | Status: DISCONTINUED | OUTPATIENT
Start: 2023-05-09 | End: 2023-05-10

## 2023-05-09 RX ORDER — INSULIN LISPRO 100/ML
10 VIAL (ML) SUBCUTANEOUS
Refills: 0 | Status: DISCONTINUED | OUTPATIENT
Start: 2023-05-09 | End: 2023-05-10

## 2023-05-09 RX ORDER — ACETAMINOPHEN 500 MG
1000 TABLET ORAL ONCE
Refills: 0 | Status: COMPLETED | OUTPATIENT
Start: 2023-05-09 | End: 2023-05-09

## 2023-05-09 RX ORDER — SODIUM CHLORIDE 9 MG/ML
250 INJECTION, SOLUTION INTRAVENOUS ONCE
Refills: 0 | Status: COMPLETED | OUTPATIENT
Start: 2023-05-09 | End: 2023-05-09

## 2023-05-09 RX ORDER — INSULIN GLARGINE 100 [IU]/ML
30 INJECTION, SOLUTION SUBCUTANEOUS EVERY MORNING
Refills: 0 | Status: DISCONTINUED | OUTPATIENT
Start: 2023-05-09 | End: 2023-05-09

## 2023-05-09 RX ADMIN — ALBUTEROL 2.5 MILLIGRAM(S): 90 AEROSOL, METERED ORAL at 10:04

## 2023-05-09 RX ADMIN — OXYCODONE HYDROCHLORIDE 5 MILLIGRAM(S): 5 TABLET ORAL at 18:25

## 2023-05-09 RX ADMIN — Medication 1000 MILLIGRAM(S): at 22:00

## 2023-05-09 RX ADMIN — Medication 2: at 16:47

## 2023-05-09 RX ADMIN — Medication 1000 MILLIGRAM(S): at 04:00

## 2023-05-09 RX ADMIN — Medication 81 MILLIGRAM(S): at 11:35

## 2023-05-09 RX ADMIN — SODIUM CHLORIDE 4 MILLILITER(S): 9 INJECTION INTRAMUSCULAR; INTRAVENOUS; SUBCUTANEOUS at 04:11

## 2023-05-09 RX ADMIN — ALBUTEROL 2.5 MILLIGRAM(S): 90 AEROSOL, METERED ORAL at 22:27

## 2023-05-09 RX ADMIN — OXYCODONE HYDROCHLORIDE 5 MILLIGRAM(S): 5 TABLET ORAL at 06:30

## 2023-05-09 RX ADMIN — Medication 400 MILLIGRAM(S): at 16:06

## 2023-05-09 RX ADMIN — SODIUM CHLORIDE 4 MILLILITER(S): 9 INJECTION INTRAMUSCULAR; INTRAVENOUS; SUBCUTANEOUS at 22:27

## 2023-05-09 RX ADMIN — CARVEDILOL PHOSPHATE 12.5 MILLIGRAM(S): 80 CAPSULE, EXTENDED RELEASE ORAL at 05:32

## 2023-05-09 RX ADMIN — SODIUM CHLORIDE 4 MILLILITER(S): 9 INJECTION INTRAMUSCULAR; INTRAVENOUS; SUBCUTANEOUS at 10:04

## 2023-05-09 RX ADMIN — Medication 10 UNIT(S): at 11:35

## 2023-05-09 RX ADMIN — CARVEDILOL PHOSPHATE 12.5 MILLIGRAM(S): 80 CAPSULE, EXTENDED RELEASE ORAL at 17:32

## 2023-05-09 RX ADMIN — SODIUM CHLORIDE 4 MILLILITER(S): 9 INJECTION INTRAMUSCULAR; INTRAVENOUS; SUBCUTANEOUS at 16:21

## 2023-05-09 RX ADMIN — SIMVASTATIN 20 MILLIGRAM(S): 20 TABLET, FILM COATED ORAL at 21:56

## 2023-05-09 RX ADMIN — OXYCODONE HYDROCHLORIDE 5 MILLIGRAM(S): 5 TABLET ORAL at 05:30

## 2023-05-09 RX ADMIN — LIDOCAINE 1 PATCH: 4 CREAM TOPICAL at 11:36

## 2023-05-09 RX ADMIN — ALBUTEROL 2.5 MILLIGRAM(S): 90 AEROSOL, METERED ORAL at 16:21

## 2023-05-09 RX ADMIN — DORNASE ALFA 2.5 MILLIGRAM(S): 1 SOLUTION RESPIRATORY (INHALATION) at 10:04

## 2023-05-09 RX ADMIN — Medication 1000 MILLIGRAM(S): at 16:36

## 2023-05-09 RX ADMIN — POLYETHYLENE GLYCOL 3350 17 GRAM(S): 17 POWDER, FOR SOLUTION ORAL at 11:36

## 2023-05-09 RX ADMIN — Medication 10 UNIT(S): at 16:48

## 2023-05-09 RX ADMIN — LIDOCAINE 1 PATCH: 4 CREAM TOPICAL at 18:19

## 2023-05-09 RX ADMIN — Medication 400 MILLIGRAM(S): at 21:45

## 2023-05-09 RX ADMIN — SENNA PLUS 2 TABLET(S): 8.6 TABLET ORAL at 21:56

## 2023-05-09 RX ADMIN — OXYCODONE HYDROCHLORIDE 5 MILLIGRAM(S): 5 TABLET ORAL at 17:55

## 2023-05-09 RX ADMIN — INSULIN GLARGINE 20 UNIT(S): 100 INJECTION, SOLUTION SUBCUTANEOUS at 10:41

## 2023-05-09 RX ADMIN — ALBUTEROL 2.5 MILLIGRAM(S): 90 AEROSOL, METERED ORAL at 04:10

## 2023-05-09 RX ADMIN — Medication 400 MILLIGRAM(S): at 03:45

## 2023-05-09 RX ADMIN — Medication 4: at 11:34

## 2023-05-09 RX ADMIN — SODIUM CHLORIDE 250 MILLILITER(S): 9 INJECTION, SOLUTION INTRAVENOUS at 05:32

## 2023-05-09 RX ADMIN — FINASTERIDE 5 MILLIGRAM(S): 5 TABLET, FILM COATED ORAL at 11:35

## 2023-05-09 RX ADMIN — LIDOCAINE 1 PATCH: 4 CREAM TOPICAL at 23:00

## 2023-05-09 NOTE — CONSULT NOTE ADULT - ASSESSMENT
82M Type 2 DM HbA1c 7.5% s/p fall with resulting L hemothorax and rib fractures. History of CAD s/p stents.    1) Type 2 DM  HbA1c 7.5% at goal for age (7-8%)  Home regimen: Lantus 25 units daily per doctor office, patient reports taking 30 units daily  Humalog 20 units BID, only eats twice per day  metformin he thinks 500mg once daily    Inpatient with some hyperglycemia noted.  Recommend while inpatient:  continue Lantus 20 units qAM as fasting glucose is well controlled. If NPO past midnight can use 80% of usual dose (16 units).  continue Admelog 10/10/10 units premeal for now - please do not hold dose for normal glucose. As long as patient is eating can give dose as ordered. May require higher dose to be determined.  Continue moderate Admelog scale premeal and moderate bedtime scale    DC plan: Lantus and Humalog doses TBD  resume metformin 500mg once daily (GFR 49)  will discuss further about adding cardioprotective medication for history of CAD - GLP1RA or SGLT2i  Follow up with PCP in [place]    2) HTN  BP goal < 130/80  on carvedilol  consider ACEI/ARB    3) Hyperlipidemia  continue simvastatin 30mg    Katie Fuentes MD  Division of Endocrinology  Pager: [phone]    If after 6PM or before 9AM, or on weekends/holidays, please call endocrine answering service for assistance ([phone]).  For nonurgent matters email [email or web address] for assistance.

## 2023-05-09 NOTE — PROGRESS NOTE ADULT - SUBJECTIVE AND OBJECTIVE BOX
CHIEF COMPLAINT: FOLLOW UP IN ICU FOR Left rib fractures left hemothorax      ISSUES:     Left 7-10 rib fractures  Left hemothorax  Anemia  Pulmonary contusion  NAIN  Uncontrolled hyperglycemia  Pain  CAD with stents ~13y ago on ASA and plavix  HTN  DM  Hyperlipidemia  BPH  Glaucoma      INTERVAL EVENTS:     On room air, afebrile, Repeat Hct stable, MAP in 70-80, on coreg and ASA 81  Lantus reduced to 20 units for morning, lispro with meals reduced 10 units. Endocrinology consulted    Had episode of vomiting yesterday with coughing bout. Non tender abdomen, less distended after BM yesterday  Plavi on hold since admission, cleared by cardiology, RCRi 2.   Creat stable around 1.4, unclear if patient has CKD at baseline.    Plan for OR Thursday          HISTORY:   Patient reports moderate pain at chest wall     PHYSICAL EXAM:   Gen: Comfortable, No acute distress  Eyes: Sclera white, Conjunctiva normal, Eyelids normal, Pupils symmetrical   ENT: Mucous membranes moist,  ,  ,    Neck: Trachea midline,  ,  ,  ,  ,  ,    CV: Rate regular, Rhythm regular,  ,  ,    Resp: Breath sounds clear, No accessory muscles use, chest wall tenderness.  Abd: Soft, distended, Non-tender, Bowel sounds normal,  ,  ,    Skin: Warm, No peripheral edema of lower extremities,  ,    : No briscoe  Neuro: Moving all 4 extremities,    Psych: A&Ox3      ASSESSMENT AND PLAN:     NEURO:  Rib fractures, chest Pain - Stable. Pain control with dilaudid and Tylenol IV              RESPIRATORY:  Hypoxia - Wean nasal cannula for goal O2sat above 92. Obtain CXR. Incentive spirometry. Chest PT and frequent suctioning. Continue bronchodilators. OOB to chair & ambulate w/ assistance. Continuous pulse oximetry for support & to prevent decompensation.       Hemothorax- NOR thursday 5/11 for LVATS, drainage of hemothorax and decortication             CARDIOVASCULAR:  Hemodynamically stable - Not on pressors. Continue hemodynamic monitoring.  Telemetry (medical test) - Reviewed by me today independently. Normal sinus rhythm.   CAD- hold plavix for surgery, continue ASa 81, continue coreg/statin.              RENAL:  NAIN improved, creat aroun 1.4 now, making urine.  Monitor IOs and electrolytes. Keep K above 4.0 and Mg above 2.0.          GASTROINTESTINAL:  GI prophylaxis not indicated  Zofran and Reglan IV PRN for nausea  Regular consistency diet             HEMATOLOGIC:  No signs of active bleeding. Monitor Hgb in CBC in AM  DVT prophylaxis with SCDs, holding off heparin SC in setting of hemothorax.           INFECTIOUS DISEASE:  All surgical sites appear clean. No signs of active infection. Will monitor for fever and leukocytosis.               ENDOCRINE:  Stable – Monitor glucose fingersticks for goal 120-180. Lantus 20 units in AM, lispro 10 units with meals, lispro SS, f/u endocrine recs.               Pertinent clinical, laboratory, radiographic, hemodynamic, echocardiographic, respiratory data, microbiologic data and chart were reviewed by myself and analyzed frequently throughout the course of the day and night by myself.    Plan discussed at length with the CTICU staff and Attending CT Surgeon -   Dr Mathieu Hassan.    Patient's status was discussed with patient at bedside.    ________________________________________________    _________________________  VITAL SIGNS:  Vital Signs Last 24 Hrs  T(C): 36.6 (09 May 2023 08:00), Max: 37.3 (08 May 2023 20:00)  T(F): 97.8 (09 May 2023 08:00), Max: 99.2 (08 May 2023 20:00)  HR: 76 (09 May 2023 11:00) (65 - 105)  BP: 145/56 (09 May 2023 11:00) (100/63 - 178/79)  BP(mean): 81 (09 May 2023 11:00) (65 - 114)  RR: 19 (09 May 2023 11:00) (17 - 28)  SpO2: 93% (09 May 2023 11:00) (90% - 98%)    Parameters below as of 09 May 2023 11:00  Patient On (Oxygen Delivery Method): room air      I/Os:   I&O's Detail    08 May 2023 07:01  -  09 May 2023 07:00  --------------------------------------------------------  IN:    IV PiggyBack: 350 mL    Oral Fluid: 840 mL  Total IN: 1190 mL    OUT:    Voided (mL): 1150 mL  Total OUT: 1150 mL    Total NET: 40 mL      09 May 2023 07:01  -  09 May 2023 12:00  --------------------------------------------------------  IN:  Total IN: 0 mL    OUT:    Voided (mL): 400 mL  Total OUT: 400 mL    Total NET: -400 mL              MEDICATIONS:  MEDICATIONS  (STANDING):  acetaminophen   IVPB .. 1000 milliGRAM(s) IV Intermittent once  albuterol    0.083% 2.5 milliGRAM(s) Nebulizer every 6 hours  aspirin enteric coated 81 milliGRAM(s) Oral daily  carvedilol 12.5 milliGRAM(s) Oral every 12 hours  dextrose 50% Injectable 25 Gram(s) IV Push once  dornase samm Solution 2.5 milliGRAM(s) Inhalation daily  finasteride 5 milliGRAM(s) Oral daily  insulin glargine Injectable (LANTUS) 20 Unit(s) SubCutaneous every morning  insulin lispro (ADMELOG) corrective regimen sliding scale   SubCutaneous three times a day before meals  insulin lispro (ADMELOG) corrective regimen sliding scale   SubCutaneous at bedtime  insulin lispro Injectable (ADMELOG) 10 Unit(s) SubCutaneous three times a day before meals  lidocaine   4% Patch 1 Patch Transdermal daily  polyethylene glycol 3350 17 Gram(s) Oral daily  senna 2 Tablet(s) Oral at bedtime  simvastatin 20 milliGRAM(s) Oral at bedtime  sodium chloride 3%  Inhalation 4 milliLiter(s) Inhalation every 6 hours    MEDICATIONS  (PRN):  oxyCODONE    IR 10 milliGRAM(s) Oral every 8 hours PRN Severe Pain (7 - 10)  oxyCODONE    IR 5 milliGRAM(s) Oral every 6 hours PRN Moderate Pain (4 - 6)      LABS:  Laboratory data was independently reviewed by me today.                           9.2    9.22  )-----------( 139      ( 09 May 2023 09:30 )             29.5     05-09    142  |  107  |  29<H>  ----------------------------<  124<H>  4.2   |  24  |  1.43<H>    Ca    8.7      09 May 2023 04:00  Phos  3.4     05-09  Mg     2.20     05-09        PT/INR - ( 09 May 2023 04:00 )   PT: 15.1 sec;   INR: 1.30 ratio         PTT - ( 09 May 2023 04:00 )  PTT:23.0 sec        RADIOLOGY:   Radiology images were independently reviewed by me today. Reports were reviewed by me today.    Xray Chest 1 View- PORTABLE-Routine:   ACC: [id number] EXAM:  XR CHEST PORTABLE ROUTINE 1V   ORDERED BY: KIMBERLEY CORRIGAN     PROCEDURE DATE:  05/09/2023          INTERPRETATION:  CLINICAL INFORMATION: Postop    TIME OF EXAMINATION: May 9 at 4:51 AM    EXAM: Portable chest    FINDINGS:  Moderate left-sided loculated effusion unchanged. Visible left lung and   right lung are clear. Heart size is stable. No pneumothorax.        COMPARISON: May 8        IMPRESSION: Follow-up postop with left pleural effusion unchanged.    --- End of Report ---            FREDDY WISE MD; Attending Radiologist  This document has been electronically signed. May  9 2023 11:36AM (05-09-23 @ 05:17)  Xray Chest 1 View- PORTABLE-Urgent:   ACC: [id number] EXAM:  XR CHEST PORTABLE URGENT 1V   ORDERED BY: SWAPNA PATRICIA     PROCEDURE DATE:  05/08/2023          INTERPRETATION:  CLINICAL INFORMATION: Fall and hemothorax    TIME OF EXAMINATION: May 8 at 7:10 PM    EXAM: Portable chest    FINDINGS:  Left pleural effusion about the same as the study earlier in the day. No   rib fractures or pneumothorax. The heart is not enlarged. Left upper lobe   and right lung are clear.        COMPARISON: May 8 at 5:41 AM        IMPRESSION: Left pleural effusion consistent with hemothorax.    --- End of Report ---            FREDDY WISE MD; Attending Radiologist  This document has been electronically signed. May  8 2023  7:23PM (05-08-23 @ 19:22)  Xray Chest 1 View- PORTABLE-Routine:   ACC: [id number] EXAM:  XR CHEST PORTABLE ROUTINE 1V   ORDERED BY:   JEFFRY GASPAR     PROCEDURE DATE:  05/08/2023          INTERPRETATION:  Chest one view    HISTORY: Follow-up hemothorax    COMPARISON STUDY: 5/6/2023    Frontal expiratory view of the chest shows the heart to be normal in   size. The lungs show clear right lung with moderate left effusion and   there is no evidence of pneumothorax nor right pleural effusion.    IMPRESSION:  Moderate left effusion. No pneumothorax.        Thank you for the courtesy of this referral.    --- End of Report ---            LEW DONOVAN MD; Attending Interventional Radiologist  This document has been electronically signed. May  8 2023 12:41PM (05-08-23 @ 06:04)

## 2023-05-09 NOTE — CONSULT NOTE ADULT - ASSESSMENT
Patient is an 83 y/o M with HTN, HLD, DM2, CAD s/p PCI (13 years ago) on aspirin and clopidogrel.     He presented following a mechanical fall at home and was diagnosed with rib fracture (rib #7-10) on the left side with an associated moderate left-sided hemothorax and blood loss anemia.    He was admitted directly to the CTICU for observation and monitoring CBCs.  Surgical plan is for the patient to undergo drainage and decortication.    Echocardiogram performed at [facility] on 5/7/23 noted normal left ventricular systolic function.    From the cardiac perspective, the patient reports having no recent exertional chest pain or dyspnea, although he reports that his exertional capacity has been limited by gait instability and imbalance.  Telemetry and ECG notes SR without arrhythmias.  He is currently hemodynamically stable.  Unremarkable cardiovascular exam.  Appears euvolemic on exam.    RCRI score 2 (ie, 10.1% 30-day risk of death, MI, or cardiac arrest based on current co-morbid conditions).  May hold clopidogrel prior to surgery, as patient exhibits stable, asymptomatic coronary artery disease with last PCI more than 13 years ago.  Continue low dose daily aspirin during the mary-procedural period.    From the cardiac perspective, the patient may proceed with thoracic surgery as outlined above, without the need for further inpatient cardiac testing or risk stratification.   Patient is an 83 y/o M with HTN, HLD, DM2, CAD s/p PCI (13 years ago) on aspirin and clopidogrel.     He presented following a mechanical fall at home and was diagnosed with rib fracture (rib #7-10) on the left side with an associated moderate left-sided hemothorax and blood loss anemia.    He was admitted directly to the CTICU for observation and monitoring CBCs.  Surgical plan is for the patient to undergo drainage and decortication.    Echocardiogram performed at [facility] on 5/7/23 noted normal left ventricular systolic function.    From the cardiac perspective, the patient reports having no recent exertional chest pain or dyspnea, although he reports that his exertional capacity has been limited by gait instability and imbalance.  Telemetry and ECG notes SR without arrhythmias.  He is currently hemodynamically stable.  Unremarkable cardiovascular exam.  Appears euvolemic on exam.    RCRI score 2 (ie, 10.1% 30-day risk of death, MI, or cardiac arrest based on current co-morbid conditions).  May hold clopidogrel prior to surgery, as patient exhibits stable, asymptomatic coronary artery disease with last PCI more than 13 years ago.  Continue low dose daily aspirin during the mary-procedural period.  Keep Hgb at least above 8 during the mary-procedural period.    From the cardiac perspective, the patient may proceed with thoracic surgery as outlined above, without the need for further inpatient cardiac testing or risk stratification.

## 2023-05-09 NOTE — DIETITIAN INITIAL EVALUATION ADULT - OTHER INFO
81 y/o male with hx HLD, HTN, CAD and DM admitted with dx of closed rib fxs with hemothorax. Visited with pt to obtain nutrition hx. Pt reported fair oral intake due to having pain and discomfort and overall not feeling well. He denies food allergies, nausea/vomiting/diarrhea/constipation, or issues with chewing/swallowing - no BMs this admission; pt ordered bowel regimen. His weight had been stable PTA. Pt said he avoided salty and high sugar foods PTA. Reinforced Consistent Carbohydrate therapeutic diet principles with patient. FS over the past 24 hrs 90 - 278 mg/dl with Lantus and Admelog insulins ordered for coverage. Discussed oral supplementation of Glucerna Therapeutic Nutrition Shake 240mls 1x daily (220kcals, 10g protein) - pt amenable towards trying. Encourage and monitor oral intake, especially of nutrition supplement. RDN services to remain available as needed.

## 2023-05-09 NOTE — DIETITIAN INITIAL EVALUATION ADULT - WEIGHT (KG)
Psychiatric Discharge Summary    Xavier Odell MRN# [id number]   Age: 36 year old YOB: 1985     Date of Admission:  3/5/2021  Date of Discharge:  3/8/2021  Admitting Physician:  Pamela Lala MD  Discharge Physician:  Pamela Lala MD (Contact: [phone])         Event Leading to Hospitalization:   Per H&P:    Patient is a 36-year-old  male he has chronic alcoholism history of major depressive disorder generalized anxiety disorder gastric ulcer pancreatitis.  Patient came to the emergency room after he told his brother about his drinking who also involved his mother.  Patient began to drink at age of 16 x 19 it was a problem he went to treatment last year and was sober for a year and in January he relapsed he has been drinking daily from morning to evening and going to work worried about paying the bills.     He was having suicidal ideation it was just a thought he had access to gun the gun is removed but he came here to get help        Patient has tolerance, withdrawal, progressive use, loss of control, spending more time and more amount than intended. Patient has made attempts to quit, is experiencing cravings, and reports negative consequences.     He has no history of seizures but has some blotches that she is that he sees? Delirium tremens       See Admission note by Santa Pérez MD on 3/6/21 for additional details.          Diagnoses:     Alcohol use disorder severe  Alcohol withdrawal severe, complicated by transaminitis. Withdrawal resolved.   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  Suicidal ideation with access to gun no plan or intent         Labs:     Recent Results (from the past 168 hour(s))   Drug abuse screen 6 urine (tox)    Collection Time: 03/05/21 10:31 PM   Result Value Ref Range    Amphetamine Qual Urine Negative NEG^Negative    Barbiturates Qual Urine Negative NEG^Negative    Benzodiazepine Qual Urine Negative  NEG^Negative    Cannabinoids Qual Urine Negative NEG^Negative    Cocaine Qual Urine Negative NEG^Negative    Ethanol Qual Urine Positive (A) NEG^Negative    Opiates Qualitative Urine Negative NEG^Negative   Alcohol breath test POCT    Collection Time: 03/05/21 11:06 PM   Result Value Ref Range    Alcohol Breath Test 0.221 (A) 0.00 - 0.01   CBC with platelets differential    Collection Time: 03/05/21 11:14 PM   Result Value Ref Range    WBC 8.7 4.0 - 11.0 10e9/L    RBC Count 5.91 (H) 4.4 - 5.9 10e12/L    Hemoglobin 16.7 13.3 - 17.7 g/dL    Hematocrit 51.8 40.0 - 53.0 %    MCV 88 78 - 100 fl    MCH 28.3 26.5 - 33.0 pg    MCHC 32.2 31.5 - 36.5 g/dL    RDW 14.6 10.0 - 15.0 %    Platelet Count 492 (H) 150 - 450 10e9/L    Diff Method Automated Method     % Neutrophils 67.2 %    % Lymphocytes 25.4 %    % Monocytes 5.8 %    % Eosinophils 0.5 %    % Basophils 0.9 %    % Immature Granulocytes 0.2 %    Nucleated RBCs 0 0 /100    Absolute Neutrophil 5.8 1.6 - 8.3 10e9/L    Absolute Lymphocytes 2.2 0.8 - 5.3 10e9/L    Absolute Monocytes 0.5 0.0 - 1.3 10e9/L    Absolute Eosinophils 0.0 0.0 - 0.7 10e9/L    Absolute Basophils 0.1 0.0 - 0.2 10e9/L    Abs Immature Granulocytes 0.0 0 - 0.4 10e9/L    Absolute Nucleated RBC 0.0    Comprehensive metabolic panel    Collection Time: 03/05/21 11:14 PM   Result Value Ref Range    Sodium 140 133 - 144 mmol/L    Potassium 4.2 3.4 - 5.3 mmol/L    Chloride 104 94 - 109 mmol/L    Carbon Dioxide 27 20 - 32 mmol/L    Anion Gap 9 3 - 14 mmol/L    Glucose 129 (H) 70 - 99 mg/dL    Urea Nitrogen 20 7 - 30 mg/dL    Creatinine 1.06 0.66 - 1.25 mg/dL    GFR Estimate >90 >60 mL/min/[1.73_m2]    GFR Estimate If Black >90 >60 mL/min/[1.73_m2]    Calcium 8.1 (L) 8.5 - 10.1 mg/dL    Bilirubin Total 0.3 0.2 - 1.3 mg/dL    Albumin 3.9 3.4 - 5.0 g/dL    Protein Total 8.5 6.8 - 8.8 g/dL    Alkaline Phosphatase 106 40 - 150 U/L     (H) 0 - 70 U/L    AST 70 (H) 0 - 45 U/L   Asymptomatic SARS-CoV-2 COVID-19  Virus (Coronavirus) by PCR    Collection Time: 03/06/21 12:48 AM    Specimen: Nasopharyngeal   Result Value Ref Range    SARS-CoV-2 Virus Specimen Source Nasopharyngeal     SARS-CoV-2 PCR Result NEGATIVE     SARS-CoV-2 PCR Comment (Note)    Hepatic panel    Collection Time: 03/07/21  6:29 AM   Result Value Ref Range    Bilirubin Direct 0.2 0.0 - 0.2 mg/dL    Bilirubin Total 0.9 0.2 - 1.3 mg/dL    Albumin 3.6 3.4 - 5.0 g/dL    Protein Total 7.9 6.8 - 8.8 g/dL    Alkaline Phosphatase 95 40 - 150 U/L     (H) 0 - 70 U/L    AST 56 (H) 0 - 45 U/L          Consults:   Consultation during this admission received from internal medicine on 3/6/21:    Assessment & Recommendations  Xavier Odell is a 36 year old man with a past medical history of asthma, GERD, ADINA, MDD, and alcohol dependency who is admitted to station 3A with alcohol withdrawal.        Alcohol Withdrawal - Patient drinks 10-15 [place] daily. Patient previously sober for one year then relapsed January 2021  -CIWA per protocol   -Valium 5-20mg Q30min PRN  -Management per psychiatry team.      ADINA  MDD - Continue duloxetine 30mg daily and mirtazapine 15mg nightly.    -Management per psychiatry      Asthma - Well controlled with albuterol, continue while inpatient.      Transaminitis, mild - , AST 70.  Suspect elevation related to ongoing alcohol abuse vs underlying hepatic steatosis.   -Repeat hepatic panel tomorrow  -Recommend outpatient follow up with PCP     GERD  Hx of Upper GI Bleeding 2/2 Gastric Ulcer (5/2019) - Admitted 5/12-5/20/2019 for massive hematemesis and hemorrhagic shock due to a Grade D esophagitis with superficial gastric ulcers.  Was on a PPI BID for 8 weeks. No longer on GI prophylaxis. Patient currently denies abdominal pain, hematochezia, hematuria, melena.   -Low threshold to restart omeprazole vs pantoprazole should patient complain of GI symptoms         Medicine will sign off, please page with any additional  concerns.      Estrellita Matthews PA-C  [facility] Course:   Patient was admitted to [facility] with attending Pamela Lala MD as a voluntary patient. The patient was placed under status 15 (15 minute checks) to ensure patient safety.     MSSA protocol was initiated due to the patient's history of alcohol abuse and concern for withdrawal symptoms.  Over the course of hospitalization, patient was treated with Valium to manage withdrawal. Last dose of Valium was given on 3/7/21. Patient completed detox on 3/8/221. He required a TOTAL of 110mg of Valium over 48 hours. He has not experienced any significant symptoms of withdrawal since that time. Xavier experienced no complications associated with withdrawal with exception of mild transaminitis that appears to be improving. Hydroxyzine and Trazodone were utilized prn for management of anxiety and sleep, respectively. Cymbalta was restarted to target symptoms of depression and was titrated to a total dose of 60 mg daily. Patient stated that he has had extended periods of mood stability in context of sobriety while taking Cymbalta, and wishes to continue it at this time. He was informed that the max dose is 120 mg daily so it may be further optimized on outpatient basis if he is tolerating it well. PTA Remeron 15 mg at bedtime was also restarted. Patient was treated with multivitamin, thiamine, and folic acid daily. He was also evaluated and treated by IM (see above). He was medically cleared at time of discharge. Anti-craving medications were discussed, and patient was not interested in starting them. He mentioned that he had been on naltrexone in the past and that it was ineffective for him. He feels confident he can maintain sobriety with additional CD supports. He would like to re-engage in OP program at [facility] in [place]. He noted that he was able to maintain sobriety for one year following completion of this program.  He tolerated  "medications well without reported side effects. R/B/A were discussed.     Treatment plan includes: Per CTC note dated 3/8/21- Writer met with pt to discuss aftercare plan. Pt states he has an appointment scheduled with [facility] on 3/9 at 11:00 am to discuss relapse and obtain new chemical health assessment to return to program. Pt reports he is hoping to discharge hospital today. Per treatment team request, pt signed REJI and placed all to pt's mother to confirm the firearms have been removed from pt's home due to suicidal statements. Left message and waiting for return call. AVS initiated.      Update: Spoke to pt's mother who reports that the gun has been removed from his home by his younger brother and \"he will not be getting the gun back.\" Mother reports she can pick-up pt later this afternoon when discharged. Pt will need to call mother when ready for discharge. AVS complete.      Patient denied alcohol cravings at time of discharge. He understands risks associated with relapse. Appears to be motivated to maintain sobriety upon discharge.      Patient did participate in groups and was visible in the milieu.     The patient's symptoms of withdrawal fully resolved.     Because this patient meets criteria for an Alcohol Use Disorder, I performed the following brief intervention on the date of this note:              1) Expressed concern that the patient is drinking at unhealthy levels known to increase their risk of alcohol related problems              2) Gave feedback linking alcohol use and health, including personalized feedback explaining how alcohol use can interact with their medical and/or psychiatric problems, and with prescribed medications.              3) Advised patient to abstain.    Patient was released to home with his mother. At the time of discharge, patient was determined to not be a danger to himself or others. He consistently denied SI/HI while on station 3A, and remained future " "oriented. He stated that he experienced brief, fleeting SI prior to admission \"when I was coming down from drinking, but I have no intention of ever doing anything.\" Denies history of suicide attempts.    RISK ASSESSMENT:  Today Xavier Odell denies SI, SIB, and HI. No overt evidence of psychosis or ling observed. Patient grossly appears to be cognitively intact. He has notable risk factors for self-harm including substance use / pending treatment, financial/legal stress, relationship conflict and male.  However, risk is mitigated by no h/o suicide attempt, no plan or intent, no h/o risky impulsive behavior, no access to lethal means, describes a safety plan, h/o seeking help when needed, symptom improvement, future oriented, feeling hopeful, commitment to family, good social support  , stable housing and good job situation. Patient does not have access to firearms. Based on all available evidence he does not appear to be at imminent risk for self-harm therefore does not meet criteria for a 72-hr hold/ involuntary hospitalization.  However, based on degree of symptoms substance use treatment, therapy and close psych FU was recommended which the pt did agree to. Patient also agreed to call 911/present to ED if any imminent safety concerns arise, including re-emergence of SI. Patient was provided with crisis resources at the time of discharge. Patient agreed to further reduce risk of self-harm by completely abstaining from illicit substances and alcohol, and agreed to remain medication adherent. Expressed understanding of the risks associated with excessive alcohol use, illicit substance use, and medication/treatment non-adherence, including increased risk of harm to self or others.             Discharge Medications:     Discharge Medication List as of 3/8/2021 12:04 PM      CONTINUE these medications which have CHANGED    Details   DULoxetine (CYMBALTA) 30 MG capsule Take 2 capsules (60 mg) by mouth daily, " Disp-60 capsule, R-0, E-Prescribe      mirtazapine (REMERON) 15 MG tablet Take 1 tablet (15 mg) by mouth At Bedtime, Disp-30 tablet, R-0, E-Prescribe         CONTINUE these medications which have NOT CHANGED    Details   albuterol (PROAIR HFA/PROVENTIL HFA/VENTOLIN HFA) 108 (90 Base) MCG/ACT inhaler Inhale 2 puffs into the lungs every 6 hours as needed , HistoricalPharmacy may dispense brand covered by insurance (Proair, or proventil or ventolin or generic albuterol inhaler)      EPINEPHrine (ANY BX GENERIC EQUIV) 0.3 MG/0.3ML injection 2-pack Inject 0.3 mg into the muscle as needed for anaphylaxis (Chicken Allergy), Historical         STOP taking these medications       folic acid (FOLVITE) 1 MG tablet Comments:   Reason for Stopping:         multivitamin w/minerals (THERA-VIT-M) tablet Comments:   Reason for Stopping:         nicotine (NICORETTE) 2 MG gum Comments:   Reason for Stopping:                    Psychiatric Examination:   Appearance:  awake, alert and adequately groomed  Attitude:  cooperative  Eye Contact:  good  Mood:  better  Affect:  appropriate and in normal range  Speech:  clear, coherent  Psychomotor Behavior:  no evidence of tardive dyskinesia, dystonia, or tics  Thought Process:  logical, linear and goal oriented  Associations:  no loose associations  Thought Content:  no evidence of suicidal ideation or homicidal ideation and no evidence of psychotic thought  Insight:  good  Judgment:  intact  Oriented to:  time, person, and place  Attention Span and Concentration:  intact  Recent and Remote Memory:  intact  Language: Able to name objects, Able to repeat phrases and Able to read and write  Fund of Knowledge: appropriate  Muscle Strength and Tone: normal  Gait and Station: Normal         Discharge Plan:   Summary: You were admitted to, then processed through, [facility] on March 6, 2021 for detoxification from alcohol.    A medical examination was performed that included lab work.   Your current  "medication regimen was scrutinized and discussed with you by Dr. Lala and Dr. Pérez.  You have met with a  and opted to do outpatient treatment at [facility].    Please make your recovery a priority, Mr. Odell! It has honestly been a pleasure working with you.      Our warm regards to your mother, as well.  You are fortunate to have such a strong, caring and effective advocate for you. Many do not.  She, in turn, is fortunate to have a fine son such as yourself. Many do not.     Main Diagnosis:  Alcohol Dependence: COMPLICATED     Major Treatments, Procedures and Findings:  You were detoxified from alcohol using the appropriate protocol(s). You have had a chemical dependency assessment.  You have had blood drawn, and the results have been reviewed with you.  Please take a copy of your laboratory work with you to your next provider appointment.  Symptoms to Report:  If you experience more anxiety, confusion, sleeplessness, deep sadness or thoughts of suicide, notify your treatment team or notify your primary care physician. IF THE SYMPTOMS YOU ARE EXPERIENCING ARE A MEDICAL EMERGENCY, CALL 911 IMMEDIATELY.      Lifestyle Adjustment: Adjust your lifestyle to get enough sleep, relaxation, exercise and excellent nutrition. Continue to develop healthy coping skills to decrease stress and promote a sober living environment. Do NOT use alcohol, illegal drugs or addictive medications other than what is currently prescribed. ALEX GUTIERRES, and a sponsor are excellent resources for support.      Medical Follow-Up: Patient states that he prefers to follow up with  \"Girish Amezcua\" once discharged from [facility], [facility], on 3/9/21.     Treatment Follow-Up: [facility] on March 9th at 11:00 am [address] [address] [place] [id number]  # [phone]     Resources:  [facility] [phone] Support Group:  BHAVIK/ALEX and " Sponsor/support.  Crisis Intervention: 937.421.3830 or 494-196-8039. Call anytime for help.  National [place] on Mental Illness (www.[place].mart[place].org): 502.602.7963 or 663-486-4195.  Alcoholics Anonymous (www.alcoholics-anonymous.org): Check your phone book for your local chapter.  Suicide Awareness Voices of Education (www.save.org): 690.521.4589  National Suicide Prevention Line (www.mentalhealthmn.org): 658.288.6810  Mental Health Consumer/Survivor Network of [place] (www.mhcsn.net): 237.770.8062 or 470-494-0871.  Mental Health Association of [place] (www.mentalhealth.org): 530.539.1519 or 327-183-2829  Substance Abuse and Mental Health Services. (www.samhsa.gov)     [facility] ([facility])  [facility] connects people seeking recovery to resources that help foster and sustain long-term recovery.  Whether you are seeking resources for treatment, transportation, housing, job training, education, health care or other pathways to recovery, [facility] is a great place to start. 140.769.4338 www.[facility].org     General Medication Instruction: See your medication papers for instructions. Take all medicines as directed.  Make no changes unless your doctor suggests them. Go to all your doctor visits.  Be sure to have all your required lab tests. This way, your medicines may be refilled on time. Do not use any drugs not prescribed by your provider. AA/NA and sponsors are excellent resources for support. Avoid alcohol at all costs!     Please Note:  If you have any questions at anytime after you are discharged please call the [facility], [place] detoxification [facility] [address] at [phone].  [facility], Behavioral Intake [phone]. Please take this discharge folder with you to all your follow up appointments, it contains your lab results, diagnosis, medication list and discharge recommendations.   THANK YOU FOR CHOOSING THE [facility]    Attestation:  The  patient has been seen and evaluated by me,  Pamela Lala MD     > 40 minutes total time that was spent and over 50% of this time was spent in counseling and coordination of care.       68.6

## 2023-05-09 NOTE — DIETITIAN INITIAL EVALUATION ADULT - PERTINENT LABORATORY DATA
05-09    142  |  107  |  29<H>  ----------------------------<  124<H>  4.2   |  24  |  1.43<H>    Ca    8.7      09 May 2023 04:00  Phos  3.4     05-09  Mg     2.20     05-09    POCT Blood Glucose.: 207 mg/dL (05-09-23 @ 10:41)  A1C with Estimated Average Glucose Result: 7.5 % (05-09-23 @ 04:00)

## 2023-05-09 NOTE — CONSULT NOTE ADULT - SUBJECTIVE AND OBJECTIVE BOX
Cardiology/Vascular Medicine Inpatient Consultation Note    Date of Admission:        CHIEF COMPLAINT:        HISTORY OF PRESENT ILLNESS:  HPI:  83 y/o M w/ Hx of HTN, HLD, DM, CAD s/p Stents (13 years ago) on Aspirin & Plavix who had a mechanical fall at home yesterday. He states that he was in bed and that when he was getting out of bed he fell and hit his left chest wall on the radiator. He denies hitting his head and LOC. Initially he was treated at home by his family but later in the day he became cool and clammy and had a possible syncopal episode, again he denies hitting his head and LOC. At this point he was brought to the ED.     In the ED he had a CT scan and was found to have fractured ribs (7-10) on the left side and a moderate hemothorax. He denies CP, SOB, & dyspnea. States that his left sided chest pain is controlled.     He was admitted directly to the CTICU for observation.  (07 May 2023 10:00)          Allergies    enalapril (Angioedema; Swelling)    Intolerances    	    MEDICATIONS:  aspirin enteric coated 81 milliGRAM(s) Oral daily  carvedilol 12.5 milliGRAM(s) Oral every 12 hours      albuterol    0.083% 2.5 milliGRAM(s) Nebulizer every 6 hours  dornase samm Solution 2.5 milliGRAM(s) Inhalation daily  sodium chloride 3%  Inhalation 4 milliLiter(s) Inhalation every 6 hours    acetaminophen   IVPB .. 1000 milliGRAM(s) IV Intermittent once  oxyCODONE    IR 10 milliGRAM(s) Oral every 8 hours PRN  oxyCODONE    IR 5 milliGRAM(s) Oral every 6 hours PRN    polyethylene glycol 3350 17 Gram(s) Oral daily  senna 2 Tablet(s) Oral at bedtime    dextrose 50% Injectable 25 Gram(s) IV Push once  finasteride 5 milliGRAM(s) Oral daily  insulin glargine Injectable (LANTUS) 30 Unit(s) SubCutaneous every morning  insulin lispro (ADMELOG) corrective regimen sliding scale   SubCutaneous three times a day before meals  insulin lispro (ADMELOG) corrective regimen sliding scale   SubCutaneous at bedtime  insulin lispro Injectable (ADMELOG) 18 Unit(s) SubCutaneous three times a day before meals  simvastatin 20 milliGRAM(s) Oral at bedtime    lidocaine   4% Patch 1 Patch Transdermal daily      PAST MEDICAL & SURGICAL HISTORY:  HTN (hypertension)      HLD (hyperlipidemia)      DM (diabetes mellitus)      CAD (coronary artery disease)      BPH (benign prostatic hyperplasia)      Glaucoma          FAMILY HISTORY:      SOCIAL HISTORY:    [ ] Non-smoker  [ ] Smoker  [ ] Alcohol    REVIEW OF SYSTEMS:  CONSTITUTIONAL: No fever, weight loss, or fatigue  EYES: No eye pain, visual disturbances, or discharge  ENMT:  No difficulty hearing, tinnitus, vertigo; No sinus or throat pain  NECK: No pain or stiffness  RESPIRATORY: No cough, wheezing, chills or hemoptysis; No Shortness of Breath  CARDIOVASCULAR: No chest pain, palpitations, passing out, dizziness, or leg swelling  GASTROINTESTINAL: No abdominal or epigastric pain. No nausea, vomiting, or hematemesis; No diarrhea or constipation. No melena or hematochezia.  GENITOURINARY: No dysuria, frequency, hematuria, or incontinence  NEUROLOGICAL: No headaches, memory loss, loss of strength, numbness, or tremors  SKIN: No itching, burning, rashes, or lesions   LYMPH Nodes: No enlarged glands  ENDOCRINE: No heat or cold intolerance; No hair loss  MUSCULOSKELETAL: No joint pain or swelling; No muscle, back, or extremity pain  PSYCHIATRIC: No depression, anxiety, mood swings, or difficulty sleeping  HEME/LYMPH: No easy bruising, or bleeding gums  ALLERY AND IMMUNOLOGIC: No hives or eczema	    [ ] All others negative	  [ ] Unable to obtain    PHYSICAL EXAM:  T(C): 37.2 (05-09-23 @ 04:00), Max: 37.3 (05-08-23 @ 20:00)  HR: 65 (05-09-23 @ 07:00) (65 - 105)  BP: 111/48 (05-09-23 @ 07:00) (111/48 - 178/79)  RR: 17 (05-09-23 @ 07:00) (17 - 28)  SpO2: 91% (05-09-23 @ 07:00) (91% - 97%)  Wt(kg): --  I&O's Summary    08 May 2023 07:01  -  09 May 2023 07:00  --------------------------------------------------------  IN: 1190 mL / OUT: 1150 mL / NET: 40 mL        Appearance: Normal	  HEENT:   Normal oral mucosa, PERRL, EOMI	  Lymphatic: No lymphadenopathy  Cardiovascular: Normal S1 S2, No JVD, No murmurs, No edema  Respiratory: Lungs clear to auscultation	  Psychiatry: A & O x 3, Mood & affect appropriate  Gastrointestinal:  Soft, Non-tender, + BS	  Skin: No rashes, No ecchymoses, No cyanosis	  Neurologic: Non-focal  Extremities: Normal range of motion, No clubbing, cyanosis or edema  Vascular: Peripheral pulses palpable 2+ bilaterally      LABS:	 	    CBC Full  -  ( 09 May 2023 04:00 )  WBC Count : 9.85 K/uL  Hemoglobin : 8.4 g/dL  Hematocrit : 26.4 %  Platelet Count - Automated : 135 K/uL  Mean Cell Volume : 86.3 fL  Mean Cell Hemoglobin : 27.5 pg  Mean Cell Hemoglobin Concentration : 31.8 gm/dL  Auto Neutrophil # : x  Auto Lymphocyte # : x  Auto Monocyte # : x  Auto Eosinophil # : x  Auto Basophil # : x  Auto Neutrophil % : x  Auto Lymphocyte % : x  Auto Monocyte % : x  Auto Eosinophil % : x  Auto Basophil % : x    05-09    142  |  107  |  29<H>  ----------------------------<  124<H>  4.2   |  24  |  1.43<H>  05-08    142  |  105  |  27<H>  ----------------------------<  124<H>  3.7   |  27  |  1.29    Ca    8.7      09 May 2023 04:00  Ca    9.1      08 May 2023 20:16  Phos  3.4     05-09  Phos  2.5     05-08  Mg     2.20     05-09  Mg     2.10     05-08      < from: Transthoracic Echocardiogram (05.07.23 @ 14:38) >    Patient name: JEM FRANCIS  YOB: 1940   Age: 82 (M)   MR#: [id number]  Study Date: 5/7/2023  [facility]  Study quality: Technically Difficult  Referring Physician: Mathieu Hassan MD  Blood Pressure: 144/80 mmHg  Height: 173 cm  Weight: 81 kg  BSA: 2 m2  ------------------------------------------------------------------------  PROCEDURE: Transthoracic echocardiogram with 2-D, M-Mode  and complete spectral and color flow Doppler.  INDICATION:Chest pain, unspecified (R07.9)  ------------------------------------------------------------------------  OBSERVATIONS:  Mitral Valve: Mitral annular calcification, otherwise  normal mitral valve. Minimal mitral regurgitation.  Aortic Root: Normal aortic root.  Aortic Valve: Aortic valve leaflet morphology not well  visualized.  Left Atrium: Normal left atrium.  Left Ventricle: Endocardium not well visualized; grossly  normal left ventricular systolic function.  Right Heart: Normal right atrium.The right ventricle is  not well visualized. Tricuspid valve not well visualized.  Pulmonic valve not well visualized.  Pericardium/PleuraNormal pericardium with no pericardial  effusion.  ------------------------------------------------------------------------  CONCLUSIONS:  Technically difficult study.  1. Mitral annular calcification, otherwise normal mitral  valve. Minimal mitral regurgitation.  2. Endocardium not well visualized; grossly normal left  ventricular systolic function.  3. The right ventricle is not well visualized.  ------------------------------------------------------------------------  Confirmed on  5/7/2023 - 15:39:42 by Dane Park M.D.,  [facility], [place]  ------------------------------------------------------------------------    < end of copied text >   Cardiology/Vascular Medicine Inpatient Consultation Note    HISTORY OF PRESENT ILLNESS:    Patient is an 83 y/o M with HTN, HLD, DM2, CAD s/p PCI (13 years ago) on aspirin and clopidogrel.     He presented following a mechanical fall at home and was diagnosed with rib fracture (rib #7-10) on the left side with an associated moderate left-sided hemothorax and blood loss anemia.    He was admitted directly to the CTICU for observation and monitoring CBCs.  Surgical plan is for the patient to undergo drainage and decortication.    Echocardiogram performed at [facility] on 5/7/23 noted normal left ventricular systolic function.    From the cardiac perspective, the patient reports having no recent exertional chest pain or dyspnea, although he reports that his exertional capacity has been limited by gait instability and imbalance.  Telemetry and ECG notes SR without arrhythmias.  He is currently hemodynamically stable.  Unremarkable cardiovascular exam.  Appears euvolemic on exam.    RCRI score 2 (ie, 10.1% 30-day risk of death, MI, or cardiac arrest based on current co-morbid conditions).  May hold clopidogrel prior to surgery, as patient exhibits stable, asymptomatic coronary artery disease with last PCI more than 13 years ago.  Continue low dose daily aspirin during the mary-procedural period.    From the cardiac perspective, the patient may proceed with thoracic surgery as outlined above, without the need for further inpatient cardiac testing or risk stratification.      Allergies  enalapril (Angioedema; Swelling)    MEDICATIONS:  aspirin enteric coated 81 milliGRAM(s) Oral daily  carvedilol 12.5 milliGRAM(s) Oral every 12 hours  albuterol    0.083% 2.5 milliGRAM(s) Nebulizer every 6 hours  dornase samm Solution 2.5 milliGRAM(s) Inhalation daily  sodium chloride 3%  Inhalation 4 milliLiter(s) Inhalation every 6 hours  acetaminophen   IVPB .. 1000 milliGRAM(s) IV Intermittent once  oxyCODONE    IR 10 milliGRAM(s) Oral every 8 hours PRN  oxyCODONE    IR 5 milliGRAM(s) Oral every 6 hours PRN  polyethylene glycol 3350 17 Gram(s) Oral daily  senna 2 Tablet(s) Oral at bedtime  dextrose 50% Injectable 25 Gram(s) IV Push once  finasteride 5 milliGRAM(s) Oral daily  insulin glargine Injectable (LANTUS) 30 Unit(s) SubCutaneous every morning  insulin lispro (ADMELOG) corrective regimen sliding scale   SubCutaneous three times a day before meals  insulin lispro (ADMELOG) corrective regimen sliding scale   SubCutaneous at bedtime  insulin lispro Injectable (ADMELOG) 18 Unit(s) SubCutaneous three times a day before meals  simvastatin 20 milliGRAM(s) Oral at bedtime  lidocaine   4% Patch 1 Patch Transdermal daily      PAST MEDICAL & SURGICAL HISTORY:  Acute blood loss anemia  Left hemothorax  NAIN  Left 7-10 rib fractures  Pulmonary contusion  Uncontrolled hyperglycemia  CAD with stents ~13y ago on ASA and plavix  HTN  DM2  Hyperlipidemia  BPH  Glaucoma    FAMILY HISTORY:  NC    SOCIAL HISTORY:    As above, as per chart notes    REVIEW OF SYSTEMS:  As above, as per chart notes    PHYSICAL EXAM:  T(C): 37.2 (05-09-23 @ 04:00), Max: 37.3 (05-08-23 @ 20:00)  HR: 65 (05-09-23 @ 07:00) (65 - 105)  BP: 111/48 (05-09-23 @ 07:00) (111/48 - 178/79)  RR: 17 (05-09-23 @ 07:00) (17 - 28)  SpO2: 91% (05-09-23 @ 07:00) (91% - 97%)  Wt(kg): --  I&O's Summary    08 May 2023 07:01  -  09 May 2023 07:00  --------------------------------------------------------  IN: 1190 mL / OUT: 1150 mL / NET: 40 mL    Appearance: NAD, laying flat in bed  HEENT:   No apparent JVD  Cardiovascular: Normal S1 S2, +CT  Respiratory: Decreased breath sounds bilaterally  Psychiatry: Awake, alert  Gastrointestinal:  Soft, Non-tender, + BS	  Neurologic: Non-focal  Extremities: No LE edema      LABS:	 	    CBC Full  -  ( 09 May 2023 04:00 )  WBC Count : 9.85 K/uL  Hemoglobin : 8.4 g/dL  Hematocrit : 26.4 %  Platelet Count - Automated : 135 K/uL  Mean Cell Volume : 86.3 fL  Mean Cell Hemoglobin : 27.5 pg  Mean Cell Hemoglobin Concentration : 31.8 gm/dL  Auto Neutrophil # : x  Auto Lymphocyte # : x  Auto Monocyte # : x  Auto Eosinophil # : x  Auto Basophil # : x  Auto Neutrophil % : x  Auto Lymphocyte % : x  Auto Monocyte % : x  Auto Eosinophil % : x  Auto Basophil % : x    05-09    142  |  107  |  29<H>  ----------------------------<  124<H>  4.2   |  24  |  1.43<H>  05-08    142  |  105  |  27<H>  ----------------------------<  124<H>  3.7   |  27  |  1.29    Ca    8.7      09 May 2023 04:00  Ca    9.1      08 May 2023 20:16  Phos  3.4     05-09  Phos  2.5     05-08  Mg     2.20     05-09  Mg     2.10     05-08      < from: Transthoracic Echocardiogram (05.07.23 @ 14:38) >    Patient name: JEM FRANCIS  YOB: 1940   Age: 82 (M)   MR#: [id number]  Study Date: 5/7/2023  [facility] RDCS  Study quality: Technically Difficult  Referring Physician: Mathieu Hassan MD  Blood Pressure: 144/80 mmHg  Height: 173 cm  Weight: 81 kg  BSA: 2 m2  ------------------------------------------------------------------------  PROCEDURE: Transthoracic echocardiogram with 2-D, M-Mode  and complete spectral and color flow Doppler.  INDICATION:Chest pain, unspecified (R07.9)  ------------------------------------------------------------------------  OBSERVATIONS:  Mitral Valve: Mitral annular calcification, otherwise  normal mitral valve. Minimal mitral regurgitation.  Aortic Root: Normal aortic root.  Aortic Valve: Aortic valve leaflet morphology not well  visualized.  Left Atrium: Normal left atrium.  Left Ventricle: Endocardium not well visualized; grossly  normal left ventricular systolic function.  Right Heart: Normal right atrium.The right ventricle is  not well visualized. Tricuspid valve not well visualized.  Pulmonic valve not well visualized.  Pericardium/PleuraNormal pericardium with no pericardial  effusion.  ------------------------------------------------------------------------  CONCLUSIONS:  Technically difficult study.  1. Mitral annular calcification, otherwise normal mitral  valve. Minimal mitral regurgitation.  2. Endocardium not well visualized; grossly normal left  ventricular systolic function.  3. The right ventricle is not well visualized.  ------------------------------------------------------------------------  Confirmed on  5/7/2023 - 15:39:42 by Dane Park M.D.,  [facility], [place]  ------------------------------------------------------------------------    < end of copied text >   Cardiology/Vascular Medicine Inpatient Consultation Note    HISTORY OF PRESENT ILLNESS:    Patient is an 83 y/o M with HTN, HLD, DM2, CAD s/p PCI (13 years ago) on aspirin and clopidogrel.     He presented following a mechanical fall at home and was diagnosed with rib fracture (rib #7-10) on the left side with an associated moderate left-sided hemothorax and blood loss anemia.    He was admitted directly to the CTICU for observation and monitoring CBCs.  Surgical plan is for the patient to undergo drainage and decortication.    Echocardiogram performed at [facility] on 5/7/23 noted normal left ventricular systolic function.    From the cardiac perspective, the patient reports having no recent exertional chest pain or dyspnea, although he reports that his exertional capacity has been limited by gait instability and imbalance.  Telemetry and ECG notes SR without arrhythmias.  He is currently hemodynamically stable.  Unremarkable cardiovascular exam.  Appears euvolemic on exam.    RCRI score 2 (ie, 10.1% 30-day risk of death, MI, or cardiac arrest based on current co-morbid conditions).  May hold clopidogrel prior to surgery, as patient exhibits stable, asymptomatic coronary artery disease with last PCI more than 13 years ago.  Continue low dose daily aspirin during the mary-procedural period.  Keep Hgb at least above 8 during the mary-procedural period.    From the cardiac perspective, the patient may proceed with thoracic surgery as outlined above, without the need for further inpatient cardiac testing or risk stratification.      Allergies  enalapril (Angioedema; Swelling)    MEDICATIONS:  aspirin enteric coated 81 milliGRAM(s) Oral daily  carvedilol 12.5 milliGRAM(s) Oral every 12 hours  albuterol    0.083% 2.5 milliGRAM(s) Nebulizer every 6 hours  dornase samm Solution 2.5 milliGRAM(s) Inhalation daily  sodium chloride 3%  Inhalation 4 milliLiter(s) Inhalation every 6 hours  acetaminophen   IVPB .. 1000 milliGRAM(s) IV Intermittent once  oxyCODONE    IR 10 milliGRAM(s) Oral every 8 hours PRN  oxyCODONE    IR 5 milliGRAM(s) Oral every 6 hours PRN  polyethylene glycol 3350 17 Gram(s) Oral daily  senna 2 Tablet(s) Oral at bedtime  dextrose 50% Injectable 25 Gram(s) IV Push once  finasteride 5 milliGRAM(s) Oral daily  insulin glargine Injectable (LANTUS) 30 Unit(s) SubCutaneous every morning  insulin lispro (ADMELOG) corrective regimen sliding scale   SubCutaneous three times a day before meals  insulin lispro (ADMELOG) corrective regimen sliding scale   SubCutaneous at bedtime  insulin lispro Injectable (ADMELOG) 18 Unit(s) SubCutaneous three times a day before meals  simvastatin 20 milliGRAM(s) Oral at bedtime  lidocaine   4% Patch 1 Patch Transdermal daily      PAST MEDICAL & SURGICAL HISTORY:  Acute blood loss anemia  Left hemothorax  NAIN  Left 7-10 rib fractures  Pulmonary contusion  Uncontrolled hyperglycemia  CAD with stents ~13y ago on ASA and plavix  HTN  DM2  Hyperlipidemia  BPH  Glaucoma    FAMILY HISTORY:  NC    SOCIAL HISTORY:    As above, as per chart notes    REVIEW OF SYSTEMS:  As above, as per chart notes    PHYSICAL EXAM:  T(C): 37.2 (05-09-23 @ 04:00), Max: 37.3 (05-08-23 @ 20:00)  HR: 65 (05-09-23 @ 07:00) (65 - 105)  BP: 111/48 (05-09-23 @ 07:00) (111/48 - 178/79)  RR: 17 (05-09-23 @ 07:00) (17 - 28)  SpO2: 91% (05-09-23 @ 07:00) (91% - 97%)  Wt(kg): --  I&O's Summary    08 May 2023 07:01  -  09 May 2023 07:00  --------------------------------------------------------  IN: 1190 mL / OUT: 1150 mL / NET: 40 mL    Appearance: NAD, laying flat in bed  HEENT:   No apparent JVD  Cardiovascular: Normal S1 S2, +CT  Respiratory: Decreased breath sounds bilaterally  Psychiatry: Awake, alert  Gastrointestinal:  Soft, Non-tender, + BS	  Neurologic: Non-focal  Extremities: No LE edema      LABS:	 	    CBC Full  -  ( 09 May 2023 04:00 )  WBC Count : 9.85 K/uL  Hemoglobin : 8.4 g/dL  Hematocrit : 26.4 %  Platelet Count - Automated : 135 K/uL  Mean Cell Volume : 86.3 fL  Mean Cell Hemoglobin : 27.5 pg  Mean Cell Hemoglobin Concentration : 31.8 gm/dL  Auto Neutrophil # : x  Auto Lymphocyte # : x  Auto Monocyte # : x  Auto Eosinophil # : x  Auto Basophil # : x  Auto Neutrophil % : x  Auto Lymphocyte % : x  Auto Monocyte % : x  Auto Eosinophil % : x  Auto Basophil % : x    05-09    142  |  107  |  29<H>  ----------------------------<  124<H>  4.2   |  24  |  1.43<H>  05-08    142  |  105  |  27<H>  ----------------------------<  124<H>  3.7   |  27  |  1.29    Ca    8.7      09 May 2023 04:00  Ca    9.1      08 May 2023 20:16  Phos  3.4     05-09  Phos  2.5     05-08  Mg     2.20     05-09  Mg     2.10     05-08      < from: Transthoracic Echocardiogram (05.07.23 @ 14:38) >    Patient name: JEM FRANCIS  YOB: 1940   Age: 82 (M)   MR#: [id number]  Study Date: 5/7/2023  [facility]CS  Study quality: Technically Difficult  Referring Physician: Mathieu Hassan MD  Blood Pressure: 144/80 mmHg  Height: 173 cm  Weight: 81 kg  BSA: 2 m2  ------------------------------------------------------------------------  PROCEDURE: Transthoracic echocardiogram with 2-D, M-Mode  and complete spectral and color flow Doppler.  INDICATION:Chest pain, unspecified (R07.9)  ------------------------------------------------------------------------  OBSERVATIONS:  Mitral Valve: Mitral annular calcification, otherwise  normal mitral valve. Minimal mitral regurgitation.  Aortic Root: Normal aortic root.  Aortic Valve: Aortic valve leaflet morphology not well  visualized.  Left Atrium: Normal left atrium.  Left Ventricle: Endocardium not well visualized; grossly  normal left ventricular systolic function.  Right Heart: Normal right atrium.The right ventricle is  not well visualized. Tricuspid valve not well visualized.  Pulmonic valve not well visualized.  Pericardium/PleuraNormal pericardium with no pericardial  effusion.  ------------------------------------------------------------------------  CONCLUSIONS:  Technically difficult study.  1. Mitral annular calcification, otherwise normal mitral  valve. Minimal mitral regurgitation.  2. Endocardium not well visualized; grossly normal left  ventricular systolic function.  3. The right ventricle is not well visualized.  ------------------------------------------------------------------------  Confirmed on  5/7/2023 - 15:39:42 by Dane Park M.D.,  [facility], [place]  ------------------------------------------------------------------------    < end of copied text >

## 2023-05-09 NOTE — DIETITIAN INITIAL EVALUATION ADULT - PERTINENT MEDS FT
MEDICATIONS  (STANDING):  acetaminophen   IVPB .. 1000 milliGRAM(s) IV Intermittent once  albuterol    0.083% 2.5 milliGRAM(s) Nebulizer every 6 hours  aspirin enteric coated 81 milliGRAM(s) Oral daily  carvedilol 12.5 milliGRAM(s) Oral every 12 hours  dextrose 50% Injectable 25 Gram(s) IV Push once  dornase samm Solution 2.5 milliGRAM(s) Inhalation daily  finasteride 5 milliGRAM(s) Oral daily  insulin glargine Injectable (LANTUS) 20 Unit(s) SubCutaneous every morning  insulin lispro (ADMELOG) corrective regimen sliding scale   SubCutaneous three times a day before meals  insulin lispro (ADMELOG) corrective regimen sliding scale   SubCutaneous at bedtime  insulin lispro Injectable (ADMELOG) 10 Unit(s) SubCutaneous three times a day before meals  lidocaine   4% Patch 1 Patch Transdermal daily  polyethylene glycol 3350 17 Gram(s) Oral daily  senna 2 Tablet(s) Oral at bedtime  simvastatin 20 milliGRAM(s) Oral at bedtime  sodium chloride 3%  Inhalation 4 milliLiter(s) Inhalation every 6 hours    MEDICATIONS  (PRN):  oxyCODONE    IR 10 milliGRAM(s) Oral every 8 hours PRN Severe Pain (7 - 10)  oxyCODONE    IR 5 milliGRAM(s) Oral every 6 hours PRN Moderate Pain (4 - 6)

## 2023-05-09 NOTE — CONSULT NOTE ADULT - SUBJECTIVE AND OBJECTIVE BOX
HPI:  83 y/o M w/ Hx of HTN, HLD, DM, CAD s/p Stents (13 years ago) on Aspirin & Plavix who had a mechanical fall at home yesterday. He states that he was in bed and that when he was getting out of bed he fell and hit his left chest wall on the radiator. He denies hitting his head and LOC. Initially he was treated at home by his family but later in the day he became cool and clammy and had a possible syncopal episode, again he denies hitting his head and LOC. At this point he was brought to the ED.   In the ED he had a CT scan and was found to have fractured ribs (7-10) on the left side and a moderate hemothorax. He denies CP, SOB, & dyspnea. States that his left sided chest pain is controlled.   He was admitted directly to the CTICU for observation.  Planned for surgery Thursday.    Endocrine history:  Type 2 DM since 1979, managed by pcp.  HbA1c 7.5%  Takes Lantus 25 units daily per doctor office, patient reports taking 30 units daily  Humalog 20 units BID, only eats twice per day  Also takes metformin he thinks 500mg once daily  Checks FS once daily in AM usually .  Has occasional hypoglycemia event/symptoms if so checks in PM.  Denies retinopathy, neuropathy. GFR 56  Patient does not believe that the fall prior to admission was due to hypoglycemia. His wife did give him juice after he fell without checking glucose.      PAST MEDICAL & SURGICAL HISTORY:  HTN (hypertension)      HLD (hyperlipidemia)      DM (diabetes mellitus)      CAD (coronary artery disease)      BPH (benign prostatic hyperplasia)      Glaucoma          FAMILY HISTORY: noncontributary      Social History: Lives in [place], was travelling in [place] with wife        MEDICATIONS  (STANDING):  acetaminophen   IVPB .. 1000 milliGRAM(s) IV Intermittent once  albuterol    0.083% 2.5 milliGRAM(s) Nebulizer every 6 hours  aspirin enteric coated 81 milliGRAM(s) Oral daily  carvedilol 12.5 milliGRAM(s) Oral every 12 hours  dextrose 50% Injectable 25 Gram(s) IV Push once  dornase samm Solution 2.5 milliGRAM(s) Inhalation daily  finasteride 5 milliGRAM(s) Oral daily  insulin glargine Injectable (LANTUS) 20 Unit(s) SubCutaneous every morning  insulin lispro (ADMELOG) corrective regimen sliding scale   SubCutaneous three times a day before meals  insulin lispro (ADMELOG) corrective regimen sliding scale   SubCutaneous at bedtime  insulin lispro Injectable (ADMELOG) 10 Unit(s) SubCutaneous three times a day before meals  lidocaine   4% Patch 1 Patch Transdermal daily  polyethylene glycol 3350 17 Gram(s) Oral daily  senna 2 Tablet(s) Oral at bedtime  simvastatin 20 milliGRAM(s) Oral at bedtime  sodium chloride 3%  Inhalation 4 milliLiter(s) Inhalation every 6 hours    MEDICATIONS  (PRN):  oxyCODONE    IR 10 milliGRAM(s) Oral every 8 hours PRN Severe Pain (7 - 10)  oxyCODONE    IR 5 milliGRAM(s) Oral every 6 hours PRN Moderate Pain (4 - 6)      Allergies    enalapril (Angioedema; Swelling)    Intolerances      Review of Systems:  Constitutional: No fever  Eyes: No blurry vision  Neuro: No tremors  HEENT: No pain  Cardiovascular: No chest pain, palpitations  Respiratory: No SOB, no cough  GI: No nausea, vomiting, abdominal pain  : No dysuria  Skin: no rash  Psych: no depression  Endocrine: no polyuria, polydipsia  Hem/lymph: no swelling  Osteoporosis: no fractures    ALL OTHER SYSTEMS REVIEWED AND NEGATIVE      PHYSICAL EXAM:  VITALS: T(C): 37.7 (05-09-23 @ 12:00)  T(F): 99.8 (05-09-23 @ 12:00), Max: 99.8 (05-09-23 @ 12:00)  HR: 73 (05-09-23 @ 14:00) (65 - 99)  BP: 147/52 (05-09-23 @ 14:00) (100/63 - 178/79)  RR:  (17 - 26)  SpO2:  (90% - 98%)  Wt(kg): --  GENERAL: NAD, well-groomed, well-developed  EYES: No proptosis, no lid lag, anicteric  HEENT:  Atraumatic, Normocephalic, moist mucous membranes  THYROID: Normal size, no palpable nodules  RESPIRATORY: Clear to auscultation bilaterally; No rales, rhonchi, wheezing  CARDIOVASCULAR: Regular rate and rhythm; No murmurs; no peripheral edema  GI: Soft, nontender, non distended, normal bowel sounds  SKIN: Dry, intact, No rashes or lesions  NEURO: sensation intact, extraocular movements intact, no tremor  PSYCH: Alert and oriented x 3, normal affect, normal mood  CUSHING'S SIGNS: no striae      CAPILLARY BLOOD GLUCOSE      POCT Blood Glucose.: 232 mg/dL (09 May 2023 11:30)  POCT Blood Glucose.: 207 mg/dL (09 May 2023 10:41)  POCT Blood Glucose.: 112 mg/dL (09 May 2023 08:16)  POCT Blood Glucose.: 90 mg/dL (08 May 2023 21:39)  POCT Blood Glucose.: 235 mg/dL (08 May 2023 16:48)                            9.2    9.22  )-----------( 139      ( 09 May 2023 09:30 )             29.5       05-09    142  |  107  |  29<H>  ----------------------------<  124<H>  4.2   |  24  |  1.43<H>    eGFR: 49<L>    Ca    8.7      05-09  Mg     2.20     05-09  Phos  3.4     05-09    TPro  6.2  /  Alb  4.2  /  TBili  0.6  /  DBili  x   /  AST  23  /  ALT  19  /  AlkPhos  84  05-06      Thyroid Function Tests:      A1C with Estimated Average Glucose Result: 7.5 % (05-09-23 @ 04:00)          Radiology:

## 2023-05-10 ENCOUNTER — TRANSCRIPTION ENCOUNTER (OUTPATIENT)
Age: 83
End: 2023-05-10

## 2023-05-10 PROBLEM — Z00.00 ENCOUNTER FOR PREVENTIVE HEALTH EXAMINATION: Status: ACTIVE | Noted: 2023-05-10

## 2023-05-10 LAB
ANION GAP SERPL CALC-SCNC: 10 MMOL/L — SIGNIFICANT CHANGE UP (ref 7–14)
BUN SERPL-MCNC: 24 MG/DL — HIGH (ref 7–23)
CALCIUM SERPL-MCNC: 8.4 MG/DL — SIGNIFICANT CHANGE UP (ref 8.4–10.5)
CHLORIDE SERPL-SCNC: 106 MMOL/L — SIGNIFICANT CHANGE UP (ref 98–107)
CO2 SERPL-SCNC: 25 MMOL/L — SIGNIFICANT CHANGE UP (ref 22–31)
CREAT SERPL-MCNC: 1.21 MG/DL — SIGNIFICANT CHANGE UP (ref 0.5–1.3)
EGFR: 60 ML/MIN/1.73M2 — SIGNIFICANT CHANGE UP
GLUCOSE BLDC GLUCOMTR-MCNC: 105 MG/DL — HIGH (ref 70–99)
GLUCOSE BLDC GLUCOMTR-MCNC: 156 MG/DL — HIGH (ref 70–99)
GLUCOSE BLDC GLUCOMTR-MCNC: 166 MG/DL — HIGH (ref 70–99)
GLUCOSE BLDC GLUCOMTR-MCNC: 271 MG/DL — HIGH (ref 70–99)
GLUCOSE SERPL-MCNC: 87 MG/DL — SIGNIFICANT CHANGE UP (ref 70–99)
HCT VFR BLD CALC: 25.6 % — LOW (ref 39–50)
HGB BLD-MCNC: 8 G/DL — LOW (ref 13–17)
MAGNESIUM SERPL-MCNC: 2.1 MG/DL — SIGNIFICANT CHANGE UP (ref 1.6–2.6)
MCHC RBC-ENTMCNC: 27.4 PG — SIGNIFICANT CHANGE UP (ref 27–34)
MCHC RBC-ENTMCNC: 31.3 GM/DL — LOW (ref 32–36)
MCV RBC AUTO: 87.7 FL — SIGNIFICANT CHANGE UP (ref 80–100)
NRBC # BLD: 0 /100 WBCS — SIGNIFICANT CHANGE UP (ref 0–0)
NRBC # FLD: 0 K/UL — SIGNIFICANT CHANGE UP (ref 0–0)
PHOSPHATE SERPL-MCNC: 3.7 MG/DL — SIGNIFICANT CHANGE UP (ref 2.5–4.5)
PLATELET # BLD AUTO: 121 K/UL — LOW (ref 150–400)
POTASSIUM SERPL-MCNC: 3.8 MMOL/L — SIGNIFICANT CHANGE UP (ref 3.5–5.3)
POTASSIUM SERPL-SCNC: 3.8 MMOL/L — SIGNIFICANT CHANGE UP (ref 3.5–5.3)
RBC # BLD: 2.92 M/UL — LOW (ref 4.2–5.8)
RBC # FLD: 14 % — SIGNIFICANT CHANGE UP (ref 10.3–14.5)
SODIUM SERPL-SCNC: 141 MMOL/L — SIGNIFICANT CHANGE UP (ref 135–145)
WBC # BLD: 7.43 K/UL — SIGNIFICANT CHANGE UP (ref 3.8–10.5)
WBC # FLD AUTO: 7.43 K/UL — SIGNIFICANT CHANGE UP (ref 3.8–10.5)

## 2023-05-10 PROCEDURE — 99232 SBSQ HOSP IP/OBS MODERATE 35: CPT

## 2023-05-10 PROCEDURE — 99233 SBSQ HOSP IP/OBS HIGH 50: CPT

## 2023-05-10 PROCEDURE — 71045 X-RAY EXAM CHEST 1 VIEW: CPT | Mod: 26

## 2023-05-10 RX ORDER — SODIUM CHLORIDE 9 MG/ML
1000 INJECTION, SOLUTION INTRAVENOUS
Refills: 0 | Status: DISCONTINUED | OUTPATIENT
Start: 2023-05-10 | End: 2023-05-12

## 2023-05-10 RX ORDER — ACETAMINOPHEN 500 MG
1000 TABLET ORAL ONCE
Refills: 0 | Status: COMPLETED | OUTPATIENT
Start: 2023-05-10 | End: 2023-05-10

## 2023-05-10 RX ORDER — INSULIN LISPRO 100/ML
12 VIAL (ML) SUBCUTANEOUS
Refills: 0 | Status: DISCONTINUED | OUTPATIENT
Start: 2023-05-10 | End: 2023-05-13

## 2023-05-10 RX ORDER — CARVEDILOL PHOSPHATE 80 MG/1
6.25 CAPSULE, EXTENDED RELEASE ORAL ONCE
Refills: 0 | Status: COMPLETED | OUTPATIENT
Start: 2023-05-10 | End: 2023-05-10

## 2023-05-10 RX ORDER — CARVEDILOL PHOSPHATE 80 MG/1
25 CAPSULE, EXTENDED RELEASE ORAL EVERY 12 HOURS
Refills: 0 | Status: DISCONTINUED | OUTPATIENT
Start: 2023-05-11 | End: 2023-05-14

## 2023-05-10 RX ORDER — INSULIN GLARGINE 100 [IU]/ML
10 INJECTION, SOLUTION SUBCUTANEOUS EVERY MORNING
Refills: 0 | Status: DISCONTINUED | OUTPATIENT
Start: 2023-05-10 | End: 2023-05-11

## 2023-05-10 RX ORDER — POTASSIUM CHLORIDE 20 MEQ
20 PACKET (EA) ORAL ONCE
Refills: 0 | Status: COMPLETED | OUTPATIENT
Start: 2023-05-10 | End: 2023-05-10

## 2023-05-10 RX ORDER — AMLODIPINE BESYLATE 2.5 MG/1
10 TABLET ORAL ONCE
Refills: 0 | Status: COMPLETED | OUTPATIENT
Start: 2023-05-10 | End: 2023-05-10

## 2023-05-10 RX ADMIN — INSULIN GLARGINE 20 UNIT(S): 100 INJECTION, SOLUTION SUBCUTANEOUS at 08:47

## 2023-05-10 RX ADMIN — CARVEDILOL PHOSPHATE 12.5 MILLIGRAM(S): 80 CAPSULE, EXTENDED RELEASE ORAL at 17:28

## 2023-05-10 RX ADMIN — ALBUTEROL 2.5 MILLIGRAM(S): 90 AEROSOL, METERED ORAL at 22:23

## 2023-05-10 RX ADMIN — Medication 2: at 17:27

## 2023-05-10 RX ADMIN — SODIUM CHLORIDE 4 MILLILITER(S): 9 INJECTION INTRAMUSCULAR; INTRAVENOUS; SUBCUTANEOUS at 09:36

## 2023-05-10 RX ADMIN — ALBUTEROL 2.5 MILLIGRAM(S): 90 AEROSOL, METERED ORAL at 16:07

## 2023-05-10 RX ADMIN — POLYETHYLENE GLYCOL 3350 17 GRAM(S): 17 POWDER, FOR SOLUTION ORAL at 10:33

## 2023-05-10 RX ADMIN — CARVEDILOL PHOSPHATE 6.25 MILLIGRAM(S): 80 CAPSULE, EXTENDED RELEASE ORAL at 19:29

## 2023-05-10 RX ADMIN — Medication 10 UNIT(S): at 11:51

## 2023-05-10 RX ADMIN — AMLODIPINE BESYLATE 10 MILLIGRAM(S): 2.5 TABLET ORAL at 21:21

## 2023-05-10 RX ADMIN — SODIUM CHLORIDE 4 MILLILITER(S): 9 INJECTION INTRAMUSCULAR; INTRAVENOUS; SUBCUTANEOUS at 22:22

## 2023-05-10 RX ADMIN — ALBUTEROL 2.5 MILLIGRAM(S): 90 AEROSOL, METERED ORAL at 04:18

## 2023-05-10 RX ADMIN — LIDOCAINE 1 PATCH: 4 CREAM TOPICAL at 22:00

## 2023-05-10 RX ADMIN — Medication 1000 MILLIGRAM(S): at 04:15

## 2023-05-10 RX ADMIN — SENNA PLUS 2 TABLET(S): 8.6 TABLET ORAL at 21:20

## 2023-05-10 RX ADMIN — Medication 400 MILLIGRAM(S): at 10:33

## 2023-05-10 RX ADMIN — Medication 10 UNIT(S): at 17:26

## 2023-05-10 RX ADMIN — Medication 1000 MILLIGRAM(S): at 20:00

## 2023-05-10 RX ADMIN — CARVEDILOL PHOSPHATE 12.5 MILLIGRAM(S): 80 CAPSULE, EXTENDED RELEASE ORAL at 05:27

## 2023-05-10 RX ADMIN — DORNASE ALFA 2.5 MILLIGRAM(S): 1 SOLUTION RESPIRATORY (INHALATION) at 09:36

## 2023-05-10 RX ADMIN — Medication 10 UNIT(S): at 08:07

## 2023-05-10 RX ADMIN — Medication 81 MILLIGRAM(S): at 10:34

## 2023-05-10 RX ADMIN — Medication 1000 MILLIGRAM(S): at 11:05

## 2023-05-10 RX ADMIN — SIMVASTATIN 20 MILLIGRAM(S): 20 TABLET, FILM COATED ORAL at 21:21

## 2023-05-10 RX ADMIN — Medication 400 MILLIGRAM(S): at 19:30

## 2023-05-10 RX ADMIN — OXYCODONE HYDROCHLORIDE 5 MILLIGRAM(S): 5 TABLET ORAL at 05:20

## 2023-05-10 RX ADMIN — LIDOCAINE 1 PATCH: 4 CREAM TOPICAL at 10:34

## 2023-05-10 RX ADMIN — OXYCODONE HYDROCHLORIDE 5 MILLIGRAM(S): 5 TABLET ORAL at 06:20

## 2023-05-10 RX ADMIN — Medication 20 MILLIEQUIVALENT(S): at 05:27

## 2023-05-10 RX ADMIN — FINASTERIDE 5 MILLIGRAM(S): 5 TABLET, FILM COATED ORAL at 10:33

## 2023-05-10 RX ADMIN — SODIUM CHLORIDE 4 MILLILITER(S): 9 INJECTION INTRAMUSCULAR; INTRAVENOUS; SUBCUTANEOUS at 16:07

## 2023-05-10 RX ADMIN — LIDOCAINE 1 PATCH: 4 CREAM TOPICAL at 19:00

## 2023-05-10 RX ADMIN — Medication 400 MILLIGRAM(S): at 04:00

## 2023-05-10 RX ADMIN — ALBUTEROL 2.5 MILLIGRAM(S): 90 AEROSOL, METERED ORAL at 09:36

## 2023-05-10 RX ADMIN — Medication 6: at 11:51

## 2023-05-10 RX ADMIN — SODIUM CHLORIDE 4 MILLILITER(S): 9 INJECTION INTRAMUSCULAR; INTRAVENOUS; SUBCUTANEOUS at 04:19

## 2023-05-10 NOTE — PROGRESS NOTE ADULT - SUBJECTIVE AND OBJECTIVE BOX
History:  denies n/v.  reports fair appetite.  Considering GLP once a week if insurance covers.  No hypoglycemia    MEDICATIONS  (STANDING):  acetaminophen   IVPB .. 1000 milliGRAM(s) IV Intermittent once  albuterol    0.083% 2.5 milliGRAM(s) Nebulizer every 6 hours  aspirin enteric coated 81 milliGRAM(s) Oral daily  carvedilol 12.5 milliGRAM(s) Oral every 12 hours  dextrose 50% Injectable 25 Gram(s) IV Push once  dornase samm Solution 2.5 milliGRAM(s) Inhalation daily  finasteride 5 milliGRAM(s) Oral daily  insulin glargine Injectable (LANTUS) 20 Unit(s) SubCutaneous every morning  insulin lispro (ADMELOG) corrective regimen sliding scale   SubCutaneous three times a day before meals  insulin lispro (ADMELOG) corrective regimen sliding scale   SubCutaneous at bedtime  insulin lispro Injectable (ADMELOG) 12 Unit(s) SubCutaneous three times a day before meals  lidocaine   4% Patch 1 Patch Transdermal daily  polyethylene glycol 3350 17 Gram(s) Oral daily  senna 2 Tablet(s) Oral at bedtime  simvastatin 20 milliGRAM(s) Oral at bedtime  sodium chloride 3%  Inhalation 4 milliLiter(s) Inhalation every 6 hours    MEDICATIONS  (PRN):  oxyCODONE    IR 10 milliGRAM(s) Oral every 8 hours PRN Severe Pain (7 - 10)  oxyCODONE    IR 5 milliGRAM(s) Oral every 6 hours PRN Moderate Pain (4 - 6)      Allergies    enalapril (Angioedema; Swelling)    Intolerances      Review of Systems:  Constitutional: No fever  Eyes: No blurry vision    ALL OTHER SYSTEMS REVIEWED AND NEGATIVE        PHYSICAL EXAM:  VITALS: T(C): 37.7 (05-10-23 @ 16:00)  T(F): 99.9 (05-10-23 @ 16:00), Max: 100 (05-10-23 @ 14:00)  HR: 77 (05-10-23 @ 16:07) (61 - 90)  BP: 167/71 (05-10-23 @ 16:00) (112/46 - 172/81)  RR:  (14 - 26)  SpO2:  (90% - 100%)  Wt(kg): --  GENERAL: NAD, well-developed  GI: Soft, nontender, non distended  SKIN: Dry, intact, No rashes or lesions  PSYCH: Alert and oriented x 3, normal affect, normal mood      CAPILLARY BLOOD GLUCOSE    POCT Blood Glucose.: 156 mg/dL (10 May 2023 17:24)  POCT Blood Glucose.: 271 mg/dL (10 May 2023 11:16)  POCT Blood Glucose.: 105 mg/dL (10 May 2023 07:50)  POCT Blood Glucose.: 116 mg/dL (09 May 2023 21:54)      05-10    141  |  106  |  24<H>  ----------------------------<  87  3.8   |  25  |  1.21    eGFR: 60    Ca    8.4      05-10  Mg     2.10     05-10  Phos  3.7     05-10      A1C with Estimated Average Glucose (05.09.23 @ 04:00)    A1C with Estimated Average Glucose Result: 7.5%   Estimated Average Glucose: 169

## 2023-05-10 NOTE — PROGRESS NOTE ADULT - SUBJECTIVE AND OBJECTIVE BOX
JEM FRANCIS      82y   Male   [id number]         enalapril (Angioedema; Swelling)             Daily     Daily Weight in k.7 (10 May 2023 05:00)Drug Dosing Weight  Height (cm): 172.7 (07 May 2023 07:30)  Weight (kg): 81.6 (07 May 2023 07:30)  BMI (kg/m2): 27.4 (07 May 2023 07:30)  BSA (m2): 1.95 (07 May 2023 07:30)    HPI:  83 y/o M w/ Hx of HTN, HLD, DM, CAD s/p Stents (13 years ago) on Aspirin & Plavix who had a mechanical fall at home yesterday. He states that he was in bed and that when he was getting out of bed he fell and hit his left chest wall on the radiator. He denies hitting his head and LOC. Initially he was treated at home by his family but later in the day he became cool and clammy and had a possible syncopal episode, again he denies hitting his head and LOC. At this point he was brought to the ED.     In the ED he had a CT scan and was found to have fractured ribs (7-10) on the left side and a moderate hemothorax. He denies CP, SOB, & dyspnea. States that his left sided chest pain is controlled.     He was admitted directly to the CTICU for observation.  (07 May 2023 10:00)    CHIEF COMPLAINT: Follow up in ICU  for postoperative care of patient who is s/p                          Issues:  Left hemothorax              Acute blood loss anemia  NAIN  Left 7-10 rib fractures  Pulmonary contusion  Uncontrolled hyperglycemia  CAD with stents ~13y ago on ASA and plavix  HTN  DM2  Hyperlipidemia  BPH  Glaucoma    Postop course:     Patient reports moderate pain at chest wall incision sites which is worse with coughing and deep breathing without associated fever or dyspnea. Pain is improved with use of PCA and  oral pain meds.         Home Medications:    PAST MEDICAL & SURGICAL HISTORY:  HTN (hypertension)      HLD (hyperlipidemia)      DM (diabetes mellitus)      CAD (coronary artery disease)      BPH (benign prostatic hyperplasia)      Glaucoma        Vital Signs Last 24 Hrs  T(C): 37.2 (10 May 2023 08:00), Max: 37.7 (09 May 2023 12:00)  T(F): 99 (10 May 2023 08:00), Max: 99.8 (09 May 2023 12:00)  HR: 81 (10 May 2023 11:00) (61 - 90)  BP: 144/95 (10 May 2023 11:00) (112/46 - 176/79)  BP(mean): 110 (10 May 2023 11:00) (64 - 111)  RR: 16 (10 May 2023 11:00) (14 - 26)  SpO2: 98% (10 May 2023 11:00) (90% - 100%)    Parameters below as of 10 May 2023 11:00  Patient On (Oxygen Delivery Method): nasal cannula  O2 Flow (L/min): 1    I&O's Detail    09 May 2023 07:01  -  10 May 2023 07:00  --------------------------------------------------------  IN:    IV PiggyBack: 200 mL  Total IN: 200 mL    OUT:    Voided (mL): 1550 mL  Total OUT: 1550 mL    Total NET: -1350 mL      10 May 2023 07:01  -  10 May 2023 11:51  --------------------------------------------------------  IN:    IV PiggyBack: 100 mL    Oral Fluid: 360 mL  Total IN: 460 mL    OUT:    Voided (mL): 580 mL  Total OUT: 580 mL    Total NET: -120 mL        CAPILLARY BLOOD GLUCOSE      POCT Blood Glucose.: 271 mg/dL (10 May 2023 11:16)  POCT Blood Glucose.: 105 mg/dL (10 May 2023 07:50)  POCT Blood Glucose.: 116 mg/dL (09 May 2023 21:54)  POCT Blood Glucose.: 181 mg/dL (09 May 2023 16:46)    Home Medications:    MEDICATIONS  (STANDING):  acetaminophen   IVPB .. 1000 milliGRAM(s) IV Intermittent once  albuterol    0.083% 2.5 milliGRAM(s) Nebulizer every 6 hours  aspirin enteric coated 81 milliGRAM(s) Oral daily  carvedilol 12.5 milliGRAM(s) Oral every 12 hours  dextrose 50% Injectable 25 Gram(s) IV Push once  dornase samm Solution 2.5 milliGRAM(s) Inhalation daily  finasteride 5 milliGRAM(s) Oral daily  insulin glargine Injectable (LANTUS) 20 Unit(s) SubCutaneous every morning  insulin lispro (ADMELOG) corrective regimen sliding scale   SubCutaneous three times a day before meals  insulin lispro (ADMELOG) corrective regimen sliding scale   SubCutaneous at bedtime  insulin lispro Injectable (ADMELOG) 10 Unit(s) SubCutaneous three times a day before meals  lidocaine   4% Patch 1 Patch Transdermal daily  polyethylene glycol 3350 17 Gram(s) Oral daily  senna 2 Tablet(s) Oral at bedtime  simvastatin 20 milliGRAM(s) Oral at bedtime  sodium chloride 3%  Inhalation 4 milliLiter(s) Inhalation every 6 hours    MEDICATIONS  (PRN):  oxyCODONE    IR 10 milliGRAM(s) Oral every 8 hours PRN Severe Pain (7 - 10)  oxyCODONE    IR 5 milliGRAM(s) Oral every 6 hours PRN Moderate Pain (4 - 6)        Physical exam:                             General:               Pt is awake, alert,  appears to be in pain but not in distress                                                  Neuro:                  Nonfocal                             Psych:                   A&Ox3                          Cardiovascular:   S1 & S2, regular                           Respiratory:         Air entry is decreased on the let side, has bilateral conducted sounds                           GI:                          Soft, nondistended and nontender, Bowel sounds active                            Ext:                        No cyanosis or edema     Labs:                                                                           8.0    7.43  )-----------( 121      ( 10 May 2023 04:23 )             25.6             05-10    141  |  106  |  24<H>  ----------------------------<  87  3.8   |  25  |  1.21    Ca    8.4      10 May 2023 04:23  Phos  3.7     05-10  Mg     2.10     05-10                    PT/INR - ( 09 May 2023 04:00 )   PT: 15.1 sec;   INR: 1.30 ratio         PTT - ( 09 May 2023 04:00 )  PTT:23.0 sec        CXR:  < from: Xray Chest 1 View- PORTABLE-Routine (Xray Chest 1 View- PORTABLE-Routine in AM.) (05.10.23 @ 06:05) >  Moderate to large loculated left effusion/hemothorax again seen unchanged.    Left anterior ninth rib fracture is visualized.    Right lung remains clear.    No pneumothorax.    COMPARISON: May 9 without change    IMPRESSION: Follow-up post left hemothorax without change.        Plan:  General: 83 y/o M w/ Hx of HTN, HLD, DM, CAD s/p Stents (13 years ago) on Aspirin & Plavix who had a mechanical fall at home yesterday. He states that he was in bed and that when he was getting out of bed he fell and hit his left chest wall on the radiator. He denies hitting his head and LOC. Initially he was treated at home by his family but later in the day he became cool and clammy and had a possible syncopal episode, again he denies hitting his head and LOC. At this point he was brought to the ED.   In the ED he had a CT scan and was found to have fractured ribs (7-10) on the left side and a moderate hemothorax. He denies CP, SOB, & dyspnea. States that his left sided chest pain is controlled.                                 Neuro:                                         Pain control with  Tylenol PRN                            Cardiovascular:                                          Telemetry (medical test) - Reviewed by me today independently. Normal sinus rhythm .      HTN: Continue Coreg    CAD: OK to continue ASA. Hold Plavix                                         Continue hemodynamic monitoring to prevent decompensation.                            Respiratory:                                         On RA, looks comfortable                                         Continuous pulse oximetry for support & to prevent decompensation.                                         Left hemothorax: For surgery on                                                                                          GI                                         On  DASH / diabetic diet as tolerated     NPO after MN                                         Continue Zofran / Reglan for nausea - PRN                                         Continue bowel regimen	                                                                 Renal:                                         NAIN: Renal function is normalized                                         Monitor I/Os and electrolytes                                                                                        Hem/ Onc:                                         DVT prophylaxis with SQ Heparin and SCDs                                         Anemia due to bleeding: Transfuse one unit of PRBC     Coags are OK.                                          Follow CBC in AM                           Infectious disease:                                            Monitor for fever / leukocytosis.                                                                    Endocrine                                             DM-2 / Hyperglycemia: Continue Lantus + Accu-Checks with coverage                                                Pertinent clinical, laboratory, radiographic, hemodynamic, echocardiographic, respiratory data, microbiologic data and chart were reviewed and analyzed frequently throughout the course of the day and night.     Patient seen, examined and plan discussed with CT Surgeon Dr. Hassan & Kaelyn  / CTICU team during rounds.    OOB to chair and ambulate with physical therapy as tolerated.     Status discussed with patient and updated plan of care.     I have spent 40 minutes with this patient including 20 minutes of time coordinating care in the ICU.          Hari Taylor MD

## 2023-05-11 ENCOUNTER — APPOINTMENT (OUTPATIENT)
Dept: THORACIC SURGERY | Facility: HOSPITAL | Age: 83
End: 2023-05-11

## 2023-05-11 ENCOUNTER — TRANSCRIPTION ENCOUNTER (OUTPATIENT)
Age: 83
End: 2023-05-11

## 2023-05-11 ENCOUNTER — RESULT REVIEW (OUTPATIENT)
Age: 83
End: 2023-05-11

## 2023-05-11 LAB
ANION GAP SERPL CALC-SCNC: 13 MMOL/L — SIGNIFICANT CHANGE UP (ref 7–14)
APTT BLD: 26.7 SEC — LOW (ref 27–36.3)
BUN SERPL-MCNC: 18 MG/DL — SIGNIFICANT CHANGE UP (ref 7–23)
CALCIUM SERPL-MCNC: 9.1 MG/DL — SIGNIFICANT CHANGE UP (ref 8.4–10.5)
CHLORIDE SERPL-SCNC: 105 MMOL/L — SIGNIFICANT CHANGE UP (ref 98–107)
CO2 SERPL-SCNC: 23 MMOL/L — SIGNIFICANT CHANGE UP (ref 22–31)
CREAT SERPL-MCNC: 1.12 MG/DL — SIGNIFICANT CHANGE UP (ref 0.5–1.3)
EGFR: 66 ML/MIN/1.73M2 — SIGNIFICANT CHANGE UP
GLUCOSE BLDC GLUCOMTR-MCNC: 153 MG/DL — HIGH (ref 70–99)
GLUCOSE BLDC GLUCOMTR-MCNC: 155 MG/DL — HIGH (ref 70–99)
GLUCOSE BLDC GLUCOMTR-MCNC: 204 MG/DL — HIGH (ref 70–99)
GLUCOSE BLDC GLUCOMTR-MCNC: 224 MG/DL — HIGH (ref 70–99)
GLUCOSE BLDC GLUCOMTR-MCNC: 235 MG/DL — HIGH (ref 70–99)
GLUCOSE SERPL-MCNC: 142 MG/DL — HIGH (ref 70–99)
GRAM STN FLD: SIGNIFICANT CHANGE UP
HCT VFR BLD CALC: 33.1 % — LOW (ref 39–50)
HGB BLD-MCNC: 10.7 G/DL — LOW (ref 13–17)
INR BLD: 1.24 RATIO — HIGH (ref 0.88–1.16)
MAGNESIUM SERPL-MCNC: 2.2 MG/DL — SIGNIFICANT CHANGE UP (ref 1.6–2.6)
MCHC RBC-ENTMCNC: 27.9 PG — SIGNIFICANT CHANGE UP (ref 27–34)
MCHC RBC-ENTMCNC: 32.3 GM/DL — SIGNIFICANT CHANGE UP (ref 32–36)
MCV RBC AUTO: 86.2 FL — SIGNIFICANT CHANGE UP (ref 80–100)
NIGHT BLUE STAIN TISS: SIGNIFICANT CHANGE UP
NRBC # BLD: 0 /100 WBCS — SIGNIFICANT CHANGE UP (ref 0–0)
NRBC # FLD: 0 K/UL — SIGNIFICANT CHANGE UP (ref 0–0)
PHOSPHATE SERPL-MCNC: 3.4 MG/DL — SIGNIFICANT CHANGE UP (ref 2.5–4.5)
PLATELET # BLD AUTO: 145 K/UL — LOW (ref 150–400)
POTASSIUM SERPL-MCNC: 4.1 MMOL/L — SIGNIFICANT CHANGE UP (ref 3.5–5.3)
POTASSIUM SERPL-SCNC: 4.1 MMOL/L — SIGNIFICANT CHANGE UP (ref 3.5–5.3)
PROTHROM AB SERPL-ACNC: 14.4 SEC — HIGH (ref 10.5–13.4)
RBC # BLD: 3.84 M/UL — LOW (ref 4.2–5.8)
RBC # FLD: 14.3 % — SIGNIFICANT CHANGE UP (ref 10.3–14.5)
SODIUM SERPL-SCNC: 141 MMOL/L — SIGNIFICANT CHANGE UP (ref 135–145)
SPECIMEN SOURCE: SIGNIFICANT CHANGE UP
WBC # BLD: 8.94 K/UL — SIGNIFICANT CHANGE UP (ref 3.8–10.5)
WBC # FLD AUTO: 8.94 K/UL — SIGNIFICANT CHANGE UP (ref 3.8–10.5)

## 2023-05-11 PROCEDURE — 88305 TISSUE EXAM BY PATHOLOGIST: CPT | Mod: 26

## 2023-05-11 PROCEDURE — 88342 IMHCHEM/IMCYTCHM 1ST ANTB: CPT | Mod: 26

## 2023-05-11 PROCEDURE — 99233 SBSQ HOSP IP/OBS HIGH 50: CPT

## 2023-05-11 PROCEDURE — 71045 X-RAY EXAM CHEST 1 VIEW: CPT | Mod: 26,77

## 2023-05-11 PROCEDURE — S2900 ROBOTIC SURGICAL SYSTEM: CPT | Mod: NC

## 2023-05-11 PROCEDURE — 32654 THORACOSCOPY CONTRL BLEEDING: CPT | Mod: AS

## 2023-05-11 PROCEDURE — 32652 THORACOSCOPY REM TOTL CORTEX: CPT | Mod: AS

## 2023-05-11 PROCEDURE — 71045 X-RAY EXAM CHEST 1 VIEW: CPT | Mod: 26

## 2023-05-11 PROCEDURE — 88341 IMHCHEM/IMCYTCHM EA ADD ANTB: CPT | Mod: 26

## 2023-05-11 PROCEDURE — 32654 THORACOSCOPY CONTRL BLEEDING: CPT

## 2023-05-11 PROCEDURE — 88112 CYTOPATH CELL ENHANCE TECH: CPT | Mod: 26

## 2023-05-11 PROCEDURE — 32652 THORACOSCOPY REM TOTL CORTEX: CPT

## 2023-05-11 DEVICE — SURGICEL 2 X 14": Type: IMPLANTABLE DEVICE | Status: FUNCTIONAL

## 2023-05-11 DEVICE — CHEST DRAIN THORACIC ARGYLE PVC 28FR STRAIGHT: Type: IMPLANTABLE DEVICE | Status: FUNCTIONAL

## 2023-05-11 DEVICE — LIGATING CLIPS WECK HEMOLOK POLYMER LARGE (PURPLE) 6: Type: IMPLANTABLE DEVICE | Status: FUNCTIONAL

## 2023-05-11 RX ORDER — HYDROMORPHONE HYDROCHLORIDE 2 MG/ML
30 INJECTION INTRAMUSCULAR; INTRAVENOUS; SUBCUTANEOUS
Refills: 0 | Status: DISCONTINUED | OUTPATIENT
Start: 2023-05-11 | End: 2023-05-12

## 2023-05-11 RX ORDER — INSULIN LISPRO 100/ML
VIAL (ML) SUBCUTANEOUS
Refills: 0 | Status: DISCONTINUED | OUTPATIENT
Start: 2023-05-11 | End: 2023-05-14

## 2023-05-11 RX ORDER — INSULIN LISPRO 100/ML
VIAL (ML) SUBCUTANEOUS AT BEDTIME
Refills: 0 | Status: DISCONTINUED | OUTPATIENT
Start: 2023-05-11 | End: 2023-05-14

## 2023-05-11 RX ORDER — ALBUMIN HUMAN 25 %
250 VIAL (ML) INTRAVENOUS ONCE
Refills: 0 | Status: COMPLETED | OUTPATIENT
Start: 2023-05-11 | End: 2023-05-11

## 2023-05-11 RX ORDER — INSULIN GLARGINE 100 [IU]/ML
20 INJECTION, SOLUTION SUBCUTANEOUS EVERY MORNING
Refills: 0 | Status: DISCONTINUED | OUTPATIENT
Start: 2023-05-11 | End: 2023-05-13

## 2023-05-11 RX ORDER — HYDROMORPHONE HYDROCHLORIDE 2 MG/ML
0.5 INJECTION INTRAMUSCULAR; INTRAVENOUS; SUBCUTANEOUS
Refills: 0 | Status: DISCONTINUED | OUTPATIENT
Start: 2023-05-11 | End: 2023-05-12

## 2023-05-11 RX ORDER — SODIUM CHLORIDE 9 MG/ML
1000 INJECTION, SOLUTION INTRAVENOUS
Refills: 0 | Status: DISCONTINUED | OUTPATIENT
Start: 2023-05-11 | End: 2023-05-14

## 2023-05-11 RX ORDER — LABETALOL HCL 100 MG
10 TABLET ORAL ONCE
Refills: 0 | Status: COMPLETED | OUTPATIENT
Start: 2023-05-11 | End: 2023-05-11

## 2023-05-11 RX ORDER — NALOXONE HYDROCHLORIDE 4 MG/.1ML
0.1 SPRAY NASAL
Refills: 0 | Status: DISCONTINUED | OUTPATIENT
Start: 2023-05-11 | End: 2023-05-14

## 2023-05-11 RX ORDER — HYDRALAZINE HCL 50 MG
10 TABLET ORAL ONCE
Refills: 0 | Status: COMPLETED | OUTPATIENT
Start: 2023-05-11 | End: 2023-05-11

## 2023-05-11 RX ORDER — ONDANSETRON 8 MG/1
4 TABLET, FILM COATED ORAL EVERY 6 HOURS
Refills: 0 | Status: DISCONTINUED | OUTPATIENT
Start: 2023-05-11 | End: 2023-05-14

## 2023-05-11 RX ORDER — DEXTROSE 50 % IN WATER 50 %
15 SYRINGE (ML) INTRAVENOUS ONCE
Refills: 0 | Status: DISCONTINUED | OUTPATIENT
Start: 2023-05-11 | End: 2023-05-14

## 2023-05-11 RX ORDER — GLUCAGON INJECTION, SOLUTION 0.5 MG/.1ML
1 INJECTION, SOLUTION SUBCUTANEOUS ONCE
Refills: 0 | Status: DISCONTINUED | OUTPATIENT
Start: 2023-05-11 | End: 2023-05-14

## 2023-05-11 RX ADMIN — SODIUM CHLORIDE 30 MILLILITER(S): 9 INJECTION, SOLUTION INTRAVENOUS at 16:56

## 2023-05-11 RX ADMIN — Medication 10 MILLIGRAM(S): at 10:20

## 2023-05-11 RX ADMIN — SODIUM CHLORIDE 4 MILLILITER(S): 9 INJECTION INTRAMUSCULAR; INTRAVENOUS; SUBCUTANEOUS at 21:40

## 2023-05-11 RX ADMIN — HYDROMORPHONE HYDROCHLORIDE 30 MILLILITER(S): 2 INJECTION INTRAMUSCULAR; INTRAVENOUS; SUBCUTANEOUS at 17:46

## 2023-05-11 RX ADMIN — ALBUTEROL 2.5 MILLIGRAM(S): 90 AEROSOL, METERED ORAL at 04:02

## 2023-05-11 RX ADMIN — HYDROMORPHONE HYDROCHLORIDE 30 MILLILITER(S): 2 INJECTION INTRAMUSCULAR; INTRAVENOUS; SUBCUTANEOUS at 19:22

## 2023-05-11 RX ADMIN — CARVEDILOL PHOSPHATE 25 MILLIGRAM(S): 80 CAPSULE, EXTENDED RELEASE ORAL at 05:30

## 2023-05-11 RX ADMIN — INSULIN GLARGINE 10 UNIT(S): 100 INJECTION, SOLUTION SUBCUTANEOUS at 09:09

## 2023-05-11 RX ADMIN — ALBUTEROL 2.5 MILLIGRAM(S): 90 AEROSOL, METERED ORAL at 10:49

## 2023-05-11 RX ADMIN — DORNASE ALFA 2.5 MILLIGRAM(S): 1 SOLUTION RESPIRATORY (INHALATION) at 10:49

## 2023-05-11 RX ADMIN — Medication 12 UNIT(S): at 17:42

## 2023-05-11 RX ADMIN — ALBUTEROL 2.5 MILLIGRAM(S): 90 AEROSOL, METERED ORAL at 21:41

## 2023-05-11 RX ADMIN — SODIUM CHLORIDE 4 MILLILITER(S): 9 INJECTION INTRAMUSCULAR; INTRAVENOUS; SUBCUTANEOUS at 04:03

## 2023-05-11 RX ADMIN — SODIUM CHLORIDE 4 MILLILITER(S): 9 INJECTION INTRAMUSCULAR; INTRAVENOUS; SUBCUTANEOUS at 10:49

## 2023-05-11 RX ADMIN — HYDROMORPHONE HYDROCHLORIDE 30 MILLILITER(S): 2 INJECTION INTRAMUSCULAR; INTRAVENOUS; SUBCUTANEOUS at 17:39

## 2023-05-11 RX ADMIN — SIMVASTATIN 20 MILLIGRAM(S): 20 TABLET, FILM COATED ORAL at 22:34

## 2023-05-11 RX ADMIN — CARVEDILOL PHOSPHATE 25 MILLIGRAM(S): 80 CAPSULE, EXTENDED RELEASE ORAL at 17:41

## 2023-05-11 RX ADMIN — SODIUM CHLORIDE 30 MILLILITER(S): 9 INJECTION, SOLUTION INTRAVENOUS at 05:31

## 2023-05-11 RX ADMIN — SENNA PLUS 2 TABLET(S): 8.6 TABLET ORAL at 22:34

## 2023-05-11 RX ADMIN — Medication 125 MILLILITER(S): at 16:21

## 2023-05-11 RX ADMIN — Medication 10 MILLIGRAM(S): at 12:15

## 2023-05-11 RX ADMIN — LIDOCAINE 1 PATCH: 4 CREAM TOPICAL at 19:00

## 2023-05-11 RX ADMIN — LIDOCAINE 1 PATCH: 4 CREAM TOPICAL at 17:41

## 2023-05-11 NOTE — BRIEF OPERATIVE NOTE - COMMENTS
I first assisted through out the entire case, including port placement, bedside assist while the surgeon at the console, and wound closure, JUAN C Miller

## 2023-05-11 NOTE — BRIEF OPERATIVE NOTE - SPECIMENS
left pleural peel, content of hemothorax both sent for micro, and left pleural fluid sent for cyto and micro

## 2023-05-11 NOTE — BRIEF OPERATIVE NOTE - NSICDXBRIEFPROCEDURE_GEN_ALL_CORE_FT
PROCEDURES:  Robotic assistance in thoracoscopic procedure 11-May-2023 16:18:48 FB, Robotic LVATS, Lysis of Adhesions and Evacuation of hemothorax Mary Kay Hill

## 2023-05-11 NOTE — PROGRESS NOTE ADULT - SUBJECTIVE AND OBJECTIVE BOX
CHIEF COMPLAINT: FOLLOW UP IN ICU FOR Left rib fractures and left hemothorax      ISSUES:     Left 7-10 rib fractures  Left hemothorax  Anemia  Pulmonary contusion  NAIN  Uncontrolled hyperglycemia  Pain  CAD with stents ~13y ago on ASA and plavix  HTN  DM  Hyperlipidemia  BPH  Glaucoma      INTERVAL EVENTS:     Tmax 100, requiring 2lNC, Hct responded to unit of PRBC given yesterday  NPO for OR today for drainage of left hemothorax and decortication          HISTORY:   Patient reports moderate pain at chest wall     PHYSICAL EXAM:   Gen: Comfortable, No acute distress  Eyes: Sclera white, Conjunctiva normal, Eyelids normal, Pupils symmetrical   ENT: Mucous membranes moist,  ,  ,    Neck: Trachea midline,  ,  ,  ,  ,  ,    CV: Rate regular, Rhythm regular,  ,  ,    Resp: Breath sounds clear, No accessory muscles use, chest wall tenderness.  Abd: Soft, distended, Non-tender, Bowel sounds normal,  ,  ,    Skin: Warm, No peripheral edema of lower extremities,  ,    : No briscoe  Neuro: Moving all 4 extremities,    Psych: A&Ox3      ASSESSMENT AND PLAN:     NEURO:  Rib fractures, chest Pain - Stable. Pain control with dilaudid and Tylenol IV              RESPIRATORY:  Hypoxia - Wean nasal cannula for goal O2sat above 92. Obtain CXR. Incentive spirometry. Chest PT and frequent suctioning. Continue bronchodilators. OOB to chair & ambulate w/ assistance. Continuous pulse oximetry for support & to prevent decompensation.       Hemothorax- NOR thursday 5/11 for LVATS, drainage of hemothorax and decortication             CARDIOVASCULAR:  Hemodynamically stable - Not on pressors. Continue hemodynamic monitoring.  Telemetry (medical test) - Reviewed by me today independently. Normal sinus rhythm.   CAD- hold plavix for surgery, continue ASa 81, continue coreg/statin.              RENAL:  NAIN improved, creat aroun 1.2 now, making urine.  Monitor IOs and electrolytes. Keep K above 4.0 and Mg above 2.0.          GASTROINTESTINAL:  GI prophylaxis not indicated  Zofran and Reglan IV PRN for nausea  NPO for OR today             HEMATOLOGIC:  No signs of active bleeding. Monitor Hgb in CBC in AM  DVT prophylaxis with SCDs, holding off heparin SC in setting of hemothorax.           INFECTIOUS DISEASE:  All surgical sites appear clean. No signs of active infection. Will monitor for fever and leukocytosis.               ENDOCRINE:  Stable – Monitor glucose fingersticks for goal 120-180. Lantus 20 units in AM (given 10 units this AM), lispro 10 units with meals, lispro SS,  endocrine recs.               Pertinent clinical, laboratory, radiographic, hemodynamic, echocardiographic, respiratory data, microbiologic data and chart were reviewed by myself and analyzed frequently throughout the course of the day and night by myself.    Plan discussed at length with the CTICU staff and Attending CT Surgeon -   Dr Conchita Art    Patient's status was discussed with patient at bedside.    ________________________________________________  _________________________  VITAL SIGNS:  Vital Signs Last 24 Hrs  T(C): 36.5 (11 May 2023 08:00), Max: 37.8 (10 May 2023 14:00)  T(F): 97.7 (11 May 2023 08:00), Max: 100 (10 May 2023 14:00)  HR: 73 (11 May 2023 11:00) (64 - 96)  BP: 164/66 (11 May 2023 11:00) (138/61 - 179/76)  BP(mean): 96 (11 May 2023 11:00) (81 - 130)  RR: 21 (11 May 2023 11:00) (15 - 26)  SpO2: 100% (11 May 2023 11:00) (94% - 100%)    Parameters below as of 11 May 2023 11:00  Patient On (Oxygen Delivery Method): room air      I/Os:   I&O's Detail    10 May 2023 07:01  -  11 May 2023 07:00  --------------------------------------------------------  IN:    IV PiggyBack: 200 mL    Lactated Ringers: 240 mL    Oral Fluid: 840 mL    PRBCs (Packed Red Blood Cells): 300 mL  Total IN: 1580 mL    OUT:    Voided (mL): 3130 mL  Total OUT: 3130 mL    Total NET: -1550 mL      11 May 2023 07:01  -  11 May 2023 12:09  --------------------------------------------------------  IN:    Lactated Ringers: 150 mL  Total IN: 150 mL    OUT:    Voided (mL): 480 mL  Total OUT: 480 mL    Total NET: -330 mL              MEDICATIONS:  MEDICATIONS  (STANDING):  acetaminophen   IVPB .. 1000 milliGRAM(s) IV Intermittent once  albuterol    0.083% 2.5 milliGRAM(s) Nebulizer every 6 hours  aspirin enteric coated 81 milliGRAM(s) Oral daily  carvedilol 25 milliGRAM(s) Oral every 12 hours  dextrose 5%. 1000 milliLiter(s) (50 mL/Hr) IV Continuous <Continuous>  dextrose 5%. 1000 milliLiter(s) (100 mL/Hr) IV Continuous <Continuous>  dextrose 50% Injectable 25 Gram(s) IV Push once  dornase samm Solution 2.5 milliGRAM(s) Inhalation daily  finasteride 5 milliGRAM(s) Oral daily  glucagon  Injectable 1 milliGRAM(s) IntraMuscular once  hydrALAZINE Injectable 10 milliGRAM(s) IV Push once  insulin glargine Injectable (LANTUS) 10 Unit(s) SubCutaneous every morning  insulin lispro Injectable (ADMELOG) 12 Unit(s) SubCutaneous three times a day before meals  lactated ringers. 1000 milliLiter(s) (30 mL/Hr) IV Continuous <Continuous>  lidocaine   4% Patch 1 Patch Transdermal daily  polyethylene glycol 3350 17 Gram(s) Oral daily  senna 2 Tablet(s) Oral at bedtime  simvastatin 20 milliGRAM(s) Oral at bedtime  sodium chloride 3%  Inhalation 4 milliLiter(s) Inhalation every 6 hours    MEDICATIONS  (PRN):  dextrose Oral Gel 15 Gram(s) Oral once PRN Blood Glucose LESS THAN 70 milliGRAM(s)/deciliter  oxyCODONE    IR 10 milliGRAM(s) Oral every 8 hours PRN Severe Pain (7 - 10)  oxyCODONE    IR 5 milliGRAM(s) Oral every 6 hours PRN Moderate Pain (4 - 6)      LABS:  Laboratory data was independently reviewed by me today.                           10.7   8.94  )-----------( 145      ( 11 May 2023 03:34 )             33.1     05-11    141  |  105  |  18  ----------------------------<  142<H>  4.1   |  23  |  1.12    Ca    9.1      11 May 2023 03:34  Phos  3.4     05-11  Mg     2.20     05-11        PT/INR - ( 11 May 2023 03:34 )   PT: 14.4 sec;   INR: 1.24 ratio         PTT - ( 11 May 2023 03:34 )  PTT:26.7 sec        RADIOLOGY:   Radiology images were independently reviewed by me today. Reports were reviewed by me today.    Xray Chest 1 View- PORTABLE-Routine:   ACC: [id number] EXAM:  XR CHEST PORTABLE ROUTINE 1V   ORDERED BY: CARMELO JON     PROCEDURE DATE:  05/10/2023          INTERPRETATION:  CLINICAL INFORMATION: Hemothorax post fall with rib   fractures.    TIME OF EXAMINATION: May 10 at 5:14 AM    EXAM: Portable chest    FINDINGS:  Moderate to large loculated left effusion/hemothorax again seen unchanged.    Left anterior ninth rib fracture is visualized.    Right lung remains clear.    No pneumothorax.        COMPARISON: May 9 without change        IMPRESSION: Follow-up post left hemothorax without change.    --- End of Report ---            FREDDY WISE MD; Attending Radiologist  This document has been electronically signed. May 10 2023 11:24AM (05-10-23 @ 06:05)  Xray Chest 1 View- PORTABLE-Routine:   ACC: [id number] EXAM:  XR CHEST PORTABLE ROUTINE 1V   ORDERED BY: KIMBERLEY CORRIGAN     PROCEDURE DATE:  05/09/2023          INTERPRETATION:  CLINICAL INFORMATION: Postop    TIME OF EXAMINATION: May 9 at 4:51 AM    EXAM: Portable chest    FINDINGS:  Moderate left-sided loculated effusion unchanged. Visible left lung and   right lung are clear. Heart size is stable. No pneumothorax.        COMPARISON: May 8        IMPRESSION: Follow-up postop with left pleural effusion unchanged.    --- End of Report ---            FREDDY WISE MD; Attending Radiologist  This document has been electronically signed. May  9 2023 11:36AM (05-09-23 @ 05:17)  Xray Chest 1 View- PORTABLE-Urgent:   ACC: [id number] EXAM:  XR CHEST PORTABLE URGENT 1V   ORDERED BY: SWAPNA PATRICIA     PROCEDURE DATE:  05/08/2023          INTERPRETATION:  CLINICAL INFORMATION: Fall and hemothorax    TIME OF EXAMINATION: May 8 at 7:10 PM    EXAM: Portable chest    FINDINGS:  Left pleural effusion about the same as the study earlier in the day. No   rib fractures or pneumothorax. The heart is not enlarged. Left upper lobe   and right lung are clear.        COMPARISON: May 8 at 5:41 AM        IMPRESSION: Left pleural effusion consistent with hemothorax.    --- End of Report ---            FREDDY WISE MD; Attending Radiologist  This document has been electronically signed. May  8 2023  7:23PM (05-08-23 @ 19:22)

## 2023-05-12 LAB
ANION GAP SERPL CALC-SCNC: 12 MMOL/L — SIGNIFICANT CHANGE UP (ref 7–14)
BUN SERPL-MCNC: 23 MG/DL — SIGNIFICANT CHANGE UP (ref 7–23)
CALCIUM SERPL-MCNC: 8.6 MG/DL — SIGNIFICANT CHANGE UP (ref 8.4–10.5)
CHLORIDE SERPL-SCNC: 106 MMOL/L — SIGNIFICANT CHANGE UP (ref 98–107)
CO2 SERPL-SCNC: 24 MMOL/L — SIGNIFICANT CHANGE UP (ref 22–31)
CREAT SERPL-MCNC: 1.26 MG/DL — SIGNIFICANT CHANGE UP (ref 0.5–1.3)
EGFR: 57 ML/MIN/1.73M2 — LOW
GLUCOSE BLDC GLUCOMTR-MCNC: 210 MG/DL — HIGH (ref 70–99)
GLUCOSE BLDC GLUCOMTR-MCNC: 296 MG/DL — HIGH (ref 70–99)
GLUCOSE BLDC GLUCOMTR-MCNC: 322 MG/DL — HIGH (ref 70–99)
GLUCOSE BLDC GLUCOMTR-MCNC: 332 MG/DL — HIGH (ref 70–99)
GLUCOSE SERPL-MCNC: 183 MG/DL — HIGH (ref 70–99)
HCT VFR BLD CALC: 31.7 % — LOW (ref 39–50)
HGB BLD-MCNC: 10 G/DL — LOW (ref 13–17)
MAGNESIUM SERPL-MCNC: 2.2 MG/DL — SIGNIFICANT CHANGE UP (ref 1.6–2.6)
MCHC RBC-ENTMCNC: 26.9 PG — LOW (ref 27–34)
MCHC RBC-ENTMCNC: 31.5 GM/DL — LOW (ref 32–36)
MCV RBC AUTO: 85.2 FL — SIGNIFICANT CHANGE UP (ref 80–100)
NIGHT BLUE STAIN TISS: SIGNIFICANT CHANGE UP
NIGHT BLUE STAIN TISS: SIGNIFICANT CHANGE UP
NRBC # BLD: 0 /100 WBCS — SIGNIFICANT CHANGE UP (ref 0–0)
NRBC # FLD: 0 K/UL — SIGNIFICANT CHANGE UP (ref 0–0)
PHOSPHATE SERPL-MCNC: 4.2 MG/DL — SIGNIFICANT CHANGE UP (ref 2.5–4.5)
PLATELET # BLD AUTO: 151 K/UL — SIGNIFICANT CHANGE UP (ref 150–400)
POTASSIUM SERPL-MCNC: 4.5 MMOL/L — SIGNIFICANT CHANGE UP (ref 3.5–5.3)
POTASSIUM SERPL-SCNC: 4.5 MMOL/L — SIGNIFICANT CHANGE UP (ref 3.5–5.3)
RBC # BLD: 3.72 M/UL — LOW (ref 4.2–5.8)
RBC # FLD: 14.3 % — SIGNIFICANT CHANGE UP (ref 10.3–14.5)
SODIUM SERPL-SCNC: 142 MMOL/L — SIGNIFICANT CHANGE UP (ref 135–145)
SPECIMEN SOURCE: SIGNIFICANT CHANGE UP
SPECIMEN SOURCE: SIGNIFICANT CHANGE UP
WBC # BLD: 11.65 K/UL — HIGH (ref 3.8–10.5)
WBC # FLD AUTO: 11.65 K/UL — HIGH (ref 3.8–10.5)

## 2023-05-12 PROCEDURE — 71045 X-RAY EXAM CHEST 1 VIEW: CPT | Mod: 26

## 2023-05-12 PROCEDURE — 71045 X-RAY EXAM CHEST 1 VIEW: CPT | Mod: 26,77

## 2023-05-12 PROCEDURE — 99232 SBSQ HOSP IP/OBS MODERATE 35: CPT

## 2023-05-12 PROCEDURE — 99233 SBSQ HOSP IP/OBS HIGH 50: CPT

## 2023-05-12 RX ORDER — SEMAGLUTIDE 0.68 MG/ML
0.25 INJECTION, SOLUTION SUBCUTANEOUS
Qty: 5 | Refills: 0
Start: 2023-05-12 | End: 2023-06-10

## 2023-05-12 RX ORDER — ASPIRIN/CALCIUM CARB/MAGNESIUM 324 MG
81 TABLET ORAL DAILY
Refills: 0 | Status: DISCONTINUED | OUTPATIENT
Start: 2023-05-12 | End: 2023-05-14

## 2023-05-12 RX ORDER — OXYCODONE HYDROCHLORIDE 5 MG/1
2.5 TABLET ORAL
Refills: 0 | Status: DISCONTINUED | OUTPATIENT
Start: 2023-05-12 | End: 2023-05-14

## 2023-05-12 RX ORDER — OXYCODONE HYDROCHLORIDE 5 MG/1
5 TABLET ORAL
Refills: 0 | Status: DISCONTINUED | OUTPATIENT
Start: 2023-05-12 | End: 2023-05-14

## 2023-05-12 RX ORDER — ACETAMINOPHEN 500 MG
1000 TABLET ORAL ONCE
Refills: 0 | Status: COMPLETED | OUTPATIENT
Start: 2023-05-12 | End: 2023-05-12

## 2023-05-12 RX ORDER — DULAGLUTIDE 4.5 MG/.5ML
0.75 INJECTION, SOLUTION SUBCUTANEOUS
Qty: 5 | Refills: 0
Start: 2023-05-12 | End: 2023-06-10

## 2023-05-12 RX ORDER — HEPARIN SODIUM 5000 [USP'U]/ML
5000 INJECTION INTRAVENOUS; SUBCUTANEOUS EVERY 8 HOURS
Refills: 0 | Status: DISCONTINUED | OUTPATIENT
Start: 2023-05-12 | End: 2023-05-14

## 2023-05-12 RX ADMIN — SENNA PLUS 2 TABLET(S): 8.6 TABLET ORAL at 21:20

## 2023-05-12 RX ADMIN — Medication 12 UNIT(S): at 11:41

## 2023-05-12 RX ADMIN — HEPARIN SODIUM 5000 UNIT(S): 5000 INJECTION INTRAVENOUS; SUBCUTANEOUS at 14:23

## 2023-05-12 RX ADMIN — SIMVASTATIN 20 MILLIGRAM(S): 20 TABLET, FILM COATED ORAL at 21:21

## 2023-05-12 RX ADMIN — POLYETHYLENE GLYCOL 3350 17 GRAM(S): 17 POWDER, FOR SOLUTION ORAL at 11:42

## 2023-05-12 RX ADMIN — Medication 81 MILLIGRAM(S): at 11:42

## 2023-05-12 RX ADMIN — LIDOCAINE 1 PATCH: 4 CREAM TOPICAL at 20:55

## 2023-05-12 RX ADMIN — SODIUM CHLORIDE 30 MILLILITER(S): 9 INJECTION, SOLUTION INTRAVENOUS at 04:58

## 2023-05-12 RX ADMIN — Medication 8: at 17:29

## 2023-05-12 RX ADMIN — FINASTERIDE 5 MILLIGRAM(S): 5 TABLET, FILM COATED ORAL at 11:42

## 2023-05-12 RX ADMIN — CARVEDILOL PHOSPHATE 25 MILLIGRAM(S): 80 CAPSULE, EXTENDED RELEASE ORAL at 17:30

## 2023-05-12 RX ADMIN — ALBUTEROL 2.5 MILLIGRAM(S): 90 AEROSOL, METERED ORAL at 10:34

## 2023-05-12 RX ADMIN — SODIUM CHLORIDE 4 MILLILITER(S): 9 INJECTION INTRAMUSCULAR; INTRAVENOUS; SUBCUTANEOUS at 03:59

## 2023-05-12 RX ADMIN — SODIUM CHLORIDE 4 MILLILITER(S): 9 INJECTION INTRAMUSCULAR; INTRAVENOUS; SUBCUTANEOUS at 10:35

## 2023-05-12 RX ADMIN — Medication 12 UNIT(S): at 07:45

## 2023-05-12 RX ADMIN — SODIUM CHLORIDE 4 MILLILITER(S): 9 INJECTION INTRAMUSCULAR; INTRAVENOUS; SUBCUTANEOUS at 16:45

## 2023-05-12 RX ADMIN — HEPARIN SODIUM 5000 UNIT(S): 5000 INJECTION INTRAVENOUS; SUBCUTANEOUS at 21:19

## 2023-05-12 RX ADMIN — Medication 4: at 07:45

## 2023-05-12 RX ADMIN — Medication 4: at 21:18

## 2023-05-12 RX ADMIN — Medication 400 MILLIGRAM(S): at 20:45

## 2023-05-12 RX ADMIN — LIDOCAINE 1 PATCH: 4 CREAM TOPICAL at 11:42

## 2023-05-12 RX ADMIN — CARVEDILOL PHOSPHATE 25 MILLIGRAM(S): 80 CAPSULE, EXTENDED RELEASE ORAL at 05:40

## 2023-05-12 RX ADMIN — INSULIN GLARGINE 20 UNIT(S): 100 INJECTION, SOLUTION SUBCUTANEOUS at 08:13

## 2023-05-12 RX ADMIN — DORNASE ALFA 2.5 MILLIGRAM(S): 1 SOLUTION RESPIRATORY (INHALATION) at 10:57

## 2023-05-12 RX ADMIN — ALBUTEROL 2.5 MILLIGRAM(S): 90 AEROSOL, METERED ORAL at 16:44

## 2023-05-12 RX ADMIN — Medication 12 UNIT(S): at 17:29

## 2023-05-12 RX ADMIN — HYDROMORPHONE HYDROCHLORIDE 30 MILLILITER(S): 2 INJECTION INTRAMUSCULAR; INTRAVENOUS; SUBCUTANEOUS at 06:57

## 2023-05-12 RX ADMIN — ALBUTEROL 2.5 MILLIGRAM(S): 90 AEROSOL, METERED ORAL at 03:58

## 2023-05-12 RX ADMIN — LIDOCAINE 1 PATCH: 4 CREAM TOPICAL at 19:00

## 2023-05-12 RX ADMIN — Medication 6: at 11:42

## 2023-05-12 RX ADMIN — LIDOCAINE 1 PATCH: 4 CREAM TOPICAL at 05:00

## 2023-05-12 NOTE — PROGRESS NOTE ADULT - SUBJECTIVE AND OBJECTIVE BOX
JEM FRANCIS      82y   Male   MRN-[id number]         enalapril (Angioedema; Swelling)             Daily Height in cm: 172.7 (11 May 2023 12:46)    Daily Weight in k.4 (12 May 2023 04:00)Drug Dosing Weight  Height (cm): 172.7 (11 May 2023 12:46)  Weight (kg): 81.6 (11 May 2023 12:46)  BMI (kg/m2): 27.4 (11 May 2023 12:46)  BSA (m2): 1.95 (11 May 2023 12:46)    81 y/o M w/ Hx of HTN, HLD, DM, CAD s/p Stents (13 years ago) on Aspirin & Plavix who had a mechanical fall at home yesterday. He states that he was in bed and that when he was getting out of bed he fell and hit his left chest wall on the radiator. He denies hitting his head and LOC. Initially he was treated at home by his family but later in the day he became cool and clammy and had a possible syncopal episode, again he denies hitting his head and LOC. At this point he was brought to the ED.     In the ED he had a CT scan and was found to have fractured ribs (7-10) on the left side and a moderate hemothorax. He denies CP, SOB, & dyspnea. States that his left sided chest pain is controlled.     He was admitted directly to the CTICU for observation.  (07 May 2023 10:00)    CHIEF COMPLAINT: Follow up in ICU  for postoperative care of patient who is s/p Robotic LVATS, Lysis of Adhesions and Evacuation of hemothorax    Procedure: Robotic LVATS, Lysis of Adhesions and Evacuation of hemothorax 2023                       Issues:  Left hemothorax              Acute blood loss anemia  NAIN  Left 7-10 rib fractures  Pulmonary contusion  Uncontrolled hyperglycemia  CAD with stents ~13y ago on ASA and plavix  HTN  DM2  Hyperlipidemia  BPH  Glaucoma    Postop course:     Patient reports moderate pain at chest wall incision sites which is worse with coughing and deep breathing without associated fever or dyspnea. Pain is improved with use of PCA and  oral pain meds.         Home Medications:    PAST MEDICAL & SURGICAL HISTORY:  HTN (hypertension)      HLD (hyperlipidemia)      DM (diabetes mellitus)      CAD (coronary artery disease)      BPH (benign prostatic hyperplasia)      Glaucoma        Vital Signs Last 24 Hrs  T(C): 37.2 (12 May 2023 08:00), Max: 37.3 (12 May 2023 04:00)  T(F): 99 (12 May 2023 08:00), Max: 99.2 (12 May 2023 04:00)  HR: 82 (12 May 2023 09:00) (63 - 90)  BP: 107/46 (12 May 2023 09:00) (99/44 - 165/70)  BP(mean): 64 (12 May 2023 09:00) (61 - 99)  RR: 17 (12 May 2023 09:00) (15 - 25)  SpO2: 99% (12 May 2023 09:00) (95% - 100%)    Parameters below as of 12 May 2023 09:00  Patient On (Oxygen Delivery Method): room air      I&O's Detail    11 May 2023 07:01  -  12 May 2023 07:00  --------------------------------------------------------  IN:    IV PiggyBack: 250 mL    Lactated Ringers: 630 mL    Oral Fluid: 60 mL  Total IN: 940 mL    OUT:    Chest Tube (mL): 230 mL    Voided (mL): 1400 mL  Total OUT: 1630 mL    Total NET: -690 mL      12 May 2023 07:01  -  12 May 2023 10:00  --------------------------------------------------------  IN:  Total IN: 0 mL    OUT:    Voided (mL): 340 mL  Total OUT: 340 mL    Total NET: -340 mL        CAPILLARY BLOOD GLUCOSE      POCT Blood Glucose.: 210 mg/dL (12 May 2023 07:28)  POCT Blood Glucose.: 153 mg/dL (11 May 2023 22:34)  POCT Blood Glucose.: 235 mg/dL (11 May 2023 17:19)  POCT Blood Glucose.: 224 mg/dL (11 May 2023 11:45)    Home Medications:    MEDICATIONS  (STANDING):  acetaminophen   IVPB .. 1000 milliGRAM(s) IV Intermittent once  albuterol    0.083% 2.5 milliGRAM(s) Nebulizer every 6 hours  aspirin enteric coated 81 milliGRAM(s) Oral daily  carvedilol 25 milliGRAM(s) Oral every 12 hours  dextrose 5%. 1000 milliLiter(s) (50 mL/Hr) IV Continuous <Continuous>  dextrose 5%. 1000 milliLiter(s) (100 mL/Hr) IV Continuous <Continuous>  dextrose 50% Injectable 25 Gram(s) IV Push once  dornase samm Solution 2.5 milliGRAM(s) Inhalation daily  finasteride 5 milliGRAM(s) Oral daily  glucagon  Injectable 1 milliGRAM(s) IntraMuscular once  heparin   Injectable 5000 Unit(s) SubCutaneous every 8 hours  insulin glargine Injectable (LANTUS) 20 Unit(s) SubCutaneous every morning  insulin lispro (ADMELOG) corrective regimen sliding scale   SubCutaneous at bedtime  insulin lispro (ADMELOG) corrective regimen sliding scale   SubCutaneous three times a day before meals  insulin lispro Injectable (ADMELOG) 12 Unit(s) SubCutaneous three times a day before meals  lactated ringers. 1000 milliLiter(s) (30 mL/Hr) IV Continuous <Continuous>  lidocaine   4% Patch 1 Patch Transdermal daily  polyethylene glycol 3350 17 Gram(s) Oral daily  senna 2 Tablet(s) Oral at bedtime  simvastatin 20 milliGRAM(s) Oral at bedtime  sodium chloride 3%  Inhalation 4 milliLiter(s) Inhalation every 6 hours    MEDICATIONS  (PRN):  dextrose Oral Gel 15 Gram(s) Oral once PRN Blood Glucose LESS THAN 70 milliGRAM(s)/deciliter  naloxone Injectable 0.1 milliGRAM(s) IV Push every 3 minutes PRN For ANY of the following changes in patient status:  A. RR LESS THAN 10 breaths per minute, B. Oxygen saturation LESS THAN 90%, C. Sedation score of 6  ondansetron Injectable 4 milliGRAM(s) IV Push every 6 hours PRN Nausea  oxyCODONE    IR 2.5 milliGRAM(s) Oral every 3 hours PRN Moderate Pain (4 - 6)  oxyCODONE    IR 5 milliGRAM(s) Oral every 3 hours PRN Severe Pain (7 - 10)        Physical exam:                             General:               Pt is awake, alert,  appears to be in pain but not in distress                                                  Neuro:                  Nonfocal                             Psych:                   A&Ox3                          Cardiovascular:   S1 & S2, regular                           Respiratory:         Air entry is fair and equal on both sides, has bilateral conducted sounds                           GI:                          Soft, nondistended and nontender, Bowel sounds active                            Ext:                        No cyanosis or edema     Labs:                                                                           10.0   11.65 )-----------( 151      ( 12 May 2023 03:00 )             31.7             05-12    142  |  106  |  23  ----------------------------<  183<H>  4.5   |  24  |  1.26    Ca    8.6      12 May 2023 03:00  Phos  4.2     05-12  Mg     2.20     05-12                    PT/INR - ( 11 May 2023 03:34 )   PT: 14.4 sec;   INR: 1.24 ratio         PTT - ( 11 May 2023 03:34 )  PTT:26.7 sec        Culture - Acid Fast - Tissue w/Smear (collected 11 May 2023 14:45)  Source: .Tissue contents of hemathorax    Culture - Fungal, Tissue (collected 11 May 2023 14:45)  Source: .Tissue contents of hemathorax  Preliminary Report (12 May 2023 07:22):    Testing in progress    Culture - Tissue with Gram Stain (collected 11 May 2023 14:45)  Source: .Tissue contents of hemathorax  Gram Stain (11 May 2023 22:19):    No polymorphonuclear leukocytes per low power field    No organisms seen per oil power field    Culture - Fungal, Tissue (collected 11 May 2023 14:40)  Source: .Tissue left upper lobe pleural peel  Preliminary Report (12 May 2023 07:22):    Testing in progress    Culture - Tissue with Gram Stain (collected 11 May 2023 14:40)  Source: .Tissue left upper lobe pleural peel  Gram Stain (11 May 2023 22:18):    No polymorphonuclear leukocytes per low power field    No organisms seen per oil power field    Culture - Fungal, Body Fluid (collected 11 May 2023 14:30)  Source: .Body Fluid left chest  Preliminary Report (12 May 2023 07:22):    Testing in progress    Culture - Body Fluid with Gram Stain (collected 11 May 2023 14:30)  Source: .Body Fluid left chest  Gram Stain (11 May 2023 23:13):    No polymorphonuclear cells seen per low power field    No organisms seen per oil power field      CXR:  < from: Xray Chest 1 View- PORTABLE-Routine (Xray Chest 1 View- PORTABLE-Routine in AM.) (23 @ 05:57) >    Heart size and the mediastinum cannot be accurately evaluated on this   projection.  The right lung remains clear.  A moderate to large loculated left pleural effusion/hemothorax is not   significantly changed. Likely associated passive atelectasis.  No pneumothorax.  Known left rib fractures are not well seen.      IMPRESSION:  Moderate to large loculated left pleural   effusion/hemothorax, not significantlychanged. Likely associated passive   atelectasis. Other underlying pathology is not excluded.        Plan:  General:   81 y/o M w/ Hx of HTN, HLD, DM, CAD s/p Stents (13 years ago) on Aspirin & Plavix who had a mechanical fall at home yesterday. He states that he was in bed and that when he was getting out of bed he fell and hit his left chest wall on the radiator. He denies hitting his head and LOC. Initially he was treated at home by his family but later in the day he became cool and clammy and had a possible syncopal episode, again he denies hitting his head and LOC. At this point he was brought to the ED.   In the ED he had a CT scan and was found to have fractured ribs (7-10) on the left side and a moderate hemothorax. He denies CP, SOB, & dyspnea. States that his left sided chest pain is controlled.                                 Neuro:                                         Pain control with PCA / Tylenol PRN                            Cardiovascular:                                          Telemetry (medical test) - Reviewed by me today independently. Normal sinus rhythm .      HTN: Continue Coreg 25mg q12hrs    CAD: OK to continue ASA / Zocar. Hold Plavix                                         Continue hemodynamic monitoring to prevent decompensation.                            Respiratory:                                         On RA, looks comfortable                                         Continuous pulse oximetry for support & to prevent decompensation.                                         Left hemothorax: s/p evacuation. Monitor chest tube output.                                                                                       GI                                         On  DASH / diabetic diet as tolerated     Continue Zofran / Reglan for nausea - PRN                                         Continue bowel regimen	                                                                 Renal:                                         NAIN: Renal function is normalized. Avoid nephrotoxic agents.                                          Monitor I/Os and electrolytes                                                                                        Hem/ Onc:                                         DVT prophylaxis with SQ Heparin and SCDs                                         Anemia due to bleeding: Transfused one unit of PRBC on 5/11     Coags are OK.                                          Follow CBC in AM                           Infectious disease:                                            Monitor for fever / leukocytosis.                                                                    Endocrine                                             DM-2 / Hyperglycemia: Continue Lantus 20u + ADMELOG + Accu-Checks with coverage                                                Pertinent clinical, laboratory, radiographic, hemodynamic, echocardiographic, respiratory data, microbiologic data and chart were reviewed and analyzed frequently throughout the course of the day and night.     Patient seen, examined and plan discussed with CT Surgeon Dr. Art  / CTICU team during rounds.    OOB to chair and ambulate with physical therapy as tolerated.     Status discussed with patient and updated plan of care.     I have spent 40 minutes with this patient including 20 minutes of time coordinating care in the ICU.          Hari Taylor MD

## 2023-05-12 NOTE — PROGRESS NOTE ADULT - SUBJECTIVE AND OBJECTIVE BOX
Anesthesia Pain Management Service- Attending Addendum    SUBJECTIVE: Patient's pain control adequate    Therapy:	  [ X] IV PCA	   [ ] Epidural           [ ] s/p Spinal Opoid              [ ] Postpartum infusion	  [ ] Patient controlled regional anesthesia (PCRA)    [ ] prn Analgesics    Allergies    enalapril (Angioedema; Swelling)    Intolerances      MEDICATIONS  (STANDING):  acetaminophen   IVPB .. 1000 milliGRAM(s) IV Intermittent once  albuterol    0.083% 2.5 milliGRAM(s) Nebulizer every 6 hours  aspirin enteric coated 81 milliGRAM(s) Oral daily  carvedilol 25 milliGRAM(s) Oral every 12 hours  dextrose 5%. 1000 milliLiter(s) (50 mL/Hr) IV Continuous <Continuous>  dextrose 5%. 1000 milliLiter(s) (100 mL/Hr) IV Continuous <Continuous>  dextrose 50% Injectable 25 Gram(s) IV Push once  dornase samm Solution 2.5 milliGRAM(s) Inhalation daily  finasteride 5 milliGRAM(s) Oral daily  glucagon  Injectable 1 milliGRAM(s) IntraMuscular once  heparin   Injectable 5000 Unit(s) SubCutaneous every 8 hours  insulin glargine Injectable (LANTUS) 20 Unit(s) SubCutaneous every morning  insulin lispro (ADMELOG) corrective regimen sliding scale   SubCutaneous three times a day before meals  insulin lispro (ADMELOG) corrective regimen sliding scale   SubCutaneous at bedtime  insulin lispro Injectable (ADMELOG) 12 Unit(s) SubCutaneous three times a day before meals  lactated ringers. 1000 milliLiter(s) (30 mL/Hr) IV Continuous <Continuous>  lidocaine   4% Patch 1 Patch Transdermal daily  polyethylene glycol 3350 17 Gram(s) Oral daily  senna 2 Tablet(s) Oral at bedtime  simvastatin 20 milliGRAM(s) Oral at bedtime  sodium chloride 3%  Inhalation 4 milliLiter(s) Inhalation every 6 hours    MEDICATIONS  (PRN):  dextrose Oral Gel 15 Gram(s) Oral once PRN Blood Glucose LESS THAN 70 milliGRAM(s)/deciliter  naloxone Injectable 0.1 milliGRAM(s) IV Push every 3 minutes PRN For ANY of the following changes in patient status:  A. RR LESS THAN 10 breaths per minute, B. Oxygen saturation LESS THAN 90%, C. Sedation score of 6  ondansetron Injectable 4 milliGRAM(s) IV Push every 6 hours PRN Nausea  oxyCODONE    IR 2.5 milliGRAM(s) Oral every 3 hours PRN Moderate Pain (4 - 6)  oxyCODONE    IR 5 milliGRAM(s) Oral every 3 hours PRN Severe Pain (7 - 10)      OBJECTIVE:   [X] No new signs     [ ] Other:    Side Effects:  [X ] None			[ ] Other:      ASSESSMENT/PLAN  -Discontinue current therapy    [ ] Therapy changed to:    [ ] IV PCA       [ ] Epidural     [ X] prn Analgesics     Comments: Pain management per primary team, APS to sign off

## 2023-05-12 NOTE — PROGRESS NOTE ADULT - SUBJECTIVE AND OBJECTIVE BOX
POST ANESTHESIA EVALUATION    82y Male POSTOP DAY 1      MENTAL STATUS: Patient participation [ x ] Awake     [  ] Arousable     [  ] Sedated    AIRWAY PATENCY: [x ] Satisfactory  [  ] Other:     Vital Signs Last 24 Hrs  T(C): 37.2 (12 May 2023 08:00), Max: 37.3 (12 May 2023 04:00)  T(F): 99 (12 May 2023 08:00), Max: 99.2 (12 May 2023 04:00)  HR: 88 (12 May 2023 12:00) (63 - 90)  BP: 136/53 (12 May 2023 12:00) (99/44 - 149/55)  BP(mean): 73 (12 May 2023 12:00) (61 - 98)  RR: 25 (12 May 2023 12:00) (15 - 25)  SpO2: 100% (12 May 2023 12:00) (95% - 100%)    Parameters below as of 12 May 2023 12:00  Patient On (Oxygen Delivery Method): room air      I&O's Summary    11 May 2023 07:01  -  12 May 2023 07:00  --------------------------------------------------------  IN: 940 mL / OUT: 1630 mL / NET: -690 mL    12 May 2023 07:01  -  12 May 2023 12:46  --------------------------------------------------------  IN: 0 mL / OUT: 340 mL / NET: -340 mL          NAUSEA/ VOMITTING:  [ x ] NONE  [  ] CONTROLLED [  ] OTHER     PAIN: [x  ] CONTROLLED WITH CURRENT REGIMEN  [  ] OTHER    [ x ] NO APPARENT ANESTHESIA COMPLICATIONS      Comments:

## 2023-05-12 NOTE — PROGRESS NOTE ADULT - SUBJECTIVE AND OBJECTIVE BOX
Anesthesia Pain Management Service    SUBJECTIVE: Patient is doing well with IV PCA and no significant problems reported.    Pain Scale Score	At rest: _4/10__ 	With Activity: ___ 	[X ] Refer to charted pain scores    THERAPY:    [ ] IV PCA Morphine		[ ] 5 mg/mL	[ ] 1 mg/mL  [X ] IV PCA Hydromorphone	[ ] 5 mg/mL	[X ] 1 mg/mL  [ ] IV PCA Fentanyl		[ ] 50 micrograms/mL    Demand dose __0.2_ lockout __6_ (minutes) Continuous Rate _0__ Total: __1.4_   mg used (in past 24 hrs)      MEDICATIONS  (STANDING):  acetaminophen   IVPB .. 1000 milliGRAM(s) IV Intermittent once  albuterol    0.083% 2.5 milliGRAM(s) Nebulizer every 6 hours  aspirin enteric coated 81 milliGRAM(s) Oral daily  carvedilol 25 milliGRAM(s) Oral every 12 hours  dextrose 5%. 1000 milliLiter(s) (50 mL/Hr) IV Continuous <Continuous>  dextrose 5%. 1000 milliLiter(s) (100 mL/Hr) IV Continuous <Continuous>  dextrose 50% Injectable 25 Gram(s) IV Push once  dornase samm Solution 2.5 milliGRAM(s) Inhalation daily  finasteride 5 milliGRAM(s) Oral daily  glucagon  Injectable 1 milliGRAM(s) IntraMuscular once  heparin   Injectable 5000 Unit(s) SubCutaneous every 8 hours  insulin glargine Injectable (LANTUS) 20 Unit(s) SubCutaneous every morning  insulin lispro (ADMELOG) corrective regimen sliding scale   SubCutaneous three times a day before meals  insulin lispro (ADMELOG) corrective regimen sliding scale   SubCutaneous at bedtime  insulin lispro Injectable (ADMELOG) 12 Unit(s) SubCutaneous three times a day before meals  lactated ringers. 1000 milliLiter(s) (30 mL/Hr) IV Continuous <Continuous>  lidocaine   4% Patch 1 Patch Transdermal daily  polyethylene glycol 3350 17 Gram(s) Oral daily  senna 2 Tablet(s) Oral at bedtime  simvastatin 20 milliGRAM(s) Oral at bedtime  sodium chloride 3%  Inhalation 4 milliLiter(s) Inhalation every 6 hours    MEDICATIONS  (PRN):  dextrose Oral Gel 15 Gram(s) Oral once PRN Blood Glucose LESS THAN 70 milliGRAM(s)/deciliter  naloxone Injectable 0.1 milliGRAM(s) IV Push every 3 minutes PRN For ANY of the following changes in patient status:  A. RR LESS THAN 10 breaths per minute, B. Oxygen saturation LESS THAN 90%, C. Sedation score of 6  ondansetron Injectable 4 milliGRAM(s) IV Push every 6 hours PRN Nausea  oxyCODONE    IR 2.5 milliGRAM(s) Oral every 3 hours PRN Moderate Pain (4 - 6)  oxyCODONE    IR 5 milliGRAM(s) Oral every 3 hours PRN Severe Pain (7 - 10)      OBJECTIVE: Patient sitting up in chair, CTx1    Sedation Score:	[ X] Alert	[ ] Drowsy 	[ ] Arousable	[ ] Asleep	[ ] Unresponsive    Side Effects:	[X ] None	[ ] Nausea	[ ] Vomiting	[ ] Pruritus  		[ ] Other:    Vital Signs Last 24 Hrs  T(C): 37.2 (12 May 2023 08:00), Max: 37.3 (12 May 2023 04:00)  T(F): 99 (12 May 2023 08:00), Max: 99.2 (12 May 2023 04:00)  HR: 82 (12 May 2023 09:00) (63 - 90)  BP: 107/46 (12 May 2023 09:00) (99/44 - 166/83)  BP(mean): 64 (12 May 2023 09:00) (61 - 99)  RR: 17 (12 May 2023 09:00) (15 - 25)  SpO2: 99% (12 May 2023 09:00) (95% - 100%)    Parameters below as of 12 May 2023 09:00  Patient On (Oxygen Delivery Method): room air        ASSESSMENT/ PLAN    Therapy to  be:	[ ] Continue   [ X] Discontinued   [X ] Change to prn Analgesics    Documentation and Verification of current medications:   [X] Done	[ ] Not done, not elligible    Comments: Discussed patient with primary team, IV PCA discontinued. PRN Oral/IV opioids and/or Adjuvant non-opioid medication to be ordered at this point.    Progress Note written now but Patient was seen earlier.

## 2023-05-12 NOTE — PROGRESS NOTE ADULT - SUBJECTIVE AND OBJECTIVE BOX
History:  denies n/v.  reports good appetite.  Considering GLP once a week if insurance covers.  No hypoglycemia.    MEDICATIONS  (STANDING):  acetaminophen   IVPB .. 1000 milliGRAM(s) IV Intermittent once  albuterol    0.083% 2.5 milliGRAM(s) Nebulizer every 6 hours  aspirin enteric coated 81 milliGRAM(s) Oral daily  carvedilol 12.5 milliGRAM(s) Oral every 12 hours  dextrose 50% Injectable 25 Gram(s) IV Push once  dornase samm Solution 2.5 milliGRAM(s) Inhalation daily  finasteride 5 milliGRAM(s) Oral daily  insulin glargine Injectable (LANTUS) 20 Unit(s) SubCutaneous every morning  insulin lispro (ADMELOG) corrective regimen sliding scale   SubCutaneous three times a day before meals  insulin lispro (ADMELOG) corrective regimen sliding scale   SubCutaneous at bedtime  insulin lispro Injectable (ADMELOG) 12 Unit(s) SubCutaneous three times a day before meals  lidocaine   4% Patch 1 Patch Transdermal daily  polyethylene glycol 3350 17 Gram(s) Oral daily  senna 2 Tablet(s) Oral at bedtime  simvastatin 20 milliGRAM(s) Oral at bedtime  sodium chloride 3%  Inhalation 4 milliLiter(s) Inhalation every 6 hours    MEDICATIONS  (PRN):  oxyCODONE    IR 10 milliGRAM(s) Oral every 8 hours PRN Severe Pain (7 - 10)  oxyCODONE    IR 5 milliGRAM(s) Oral every 6 hours PRN Moderate Pain (4 - 6)      Allergies    enalapril (Angioedema; Swelling)    Intolerances      Review of Systems:  Constitutional: No fever  Eyes: No blurry vision    ALL OTHER SYSTEMS REVIEWED AND NEGATIVE        Vital Signs Last 24 Hrs  T(C): 37.2 (12 May 2023 08:00), Max: 37.3 (12 May 2023 04:00)  T(F): 99 (12 May 2023 08:00), Max: 99.2 (12 May 2023 04:00)  HR: 80 (12 May 2023 14:00) (63 - 91)  BP: 123/89 (12 May 2023 14:00) (99/44 - 149/55)  BP(mean): 101 (12 May 2023 14:00) (61 - 101)  RR: 24 (12 May 2023 14:00) (15 - 25)  SpO2: 100% (12 May 2023 14:00) (95% - 100%)    Parameters below as of 12 May 2023 14:00  Patient On (Oxygen Delivery Method): room air      GENERAL: NAD, well-developed  GI: Soft, nontender, non distended  SKIN: Dry, intact, No rashes or lesions  PSYCH: Alert and oriented x 3, normal affect, normal mood      CAPILLARY BLOOD GLUCOSE    POCT Blood Glucose.: 296 mg/dL (12 May 2023 11:36)  POCT Blood Glucose.: 210 mg/dL (12 May 2023 07:28)  POCT Blood Glucose.: 153 mg/dL (11 May 2023 22:34)  POCT Blood Glucose.: 235 mg/dL (11 May 2023 17:19)  POCT Blood Glucose.: 156 mg/dL (10 May 2023 17:24)  POCT Blood Glucose.: 271 mg/dL (10 May 2023 11:16)  POCT Blood Glucose.: 105 mg/dL (10 May 2023 07:50)  POCT Blood Glucose.: 116 mg/dL (09 May 2023 21:54)      05-12    142  |  106  |  23  ----------------------------<  183<H>  4.5   |  24  |  1.26    Ca    8.6      12 May 2023 03:00  Phos  4.2     05-12  Mg     2.20     05-12      A1C with Estimated Average Glucose (05.09.23 @ 04:00)    A1C with Estimated Average Glucose Result: 7.5%   Estimated Average Glucose: 169

## 2023-05-13 ENCOUNTER — TRANSCRIPTION ENCOUNTER (OUTPATIENT)
Age: 83
End: 2023-05-13

## 2023-05-13 LAB
ANION GAP SERPL CALC-SCNC: 13 MMOL/L — SIGNIFICANT CHANGE UP (ref 7–14)
BUN SERPL-MCNC: 33 MG/DL — HIGH (ref 7–23)
CALCIUM SERPL-MCNC: 8.6 MG/DL — SIGNIFICANT CHANGE UP (ref 8.4–10.5)
CHLORIDE SERPL-SCNC: 103 MMOL/L — SIGNIFICANT CHANGE UP (ref 98–107)
CO2 SERPL-SCNC: 21 MMOL/L — LOW (ref 22–31)
CREAT SERPL-MCNC: 1.3 MG/DL — SIGNIFICANT CHANGE UP (ref 0.5–1.3)
EGFR: 55 ML/MIN/1.73M2 — LOW
GLUCOSE BLDC GLUCOMTR-MCNC: 166 MG/DL — HIGH (ref 70–99)
GLUCOSE BLDC GLUCOMTR-MCNC: 245 MG/DL — HIGH (ref 70–99)
GLUCOSE BLDC GLUCOMTR-MCNC: 295 MG/DL — HIGH (ref 70–99)
GLUCOSE BLDC GLUCOMTR-MCNC: 376 MG/DL — HIGH (ref 70–99)
GLUCOSE SERPL-MCNC: 236 MG/DL — HIGH (ref 70–99)
HCT VFR BLD CALC: 29.6 % — LOW (ref 39–50)
HGB BLD-MCNC: 9.7 G/DL — LOW (ref 13–17)
MAGNESIUM SERPL-MCNC: 2.2 MG/DL — SIGNIFICANT CHANGE UP (ref 1.6–2.6)
MCHC RBC-ENTMCNC: 27.5 PG — SIGNIFICANT CHANGE UP (ref 27–34)
MCHC RBC-ENTMCNC: 32.8 GM/DL — SIGNIFICANT CHANGE UP (ref 32–36)
MCV RBC AUTO: 83.9 FL — SIGNIFICANT CHANGE UP (ref 80–100)
NRBC # BLD: 0 /100 WBCS — SIGNIFICANT CHANGE UP (ref 0–0)
NRBC # FLD: 0 K/UL — SIGNIFICANT CHANGE UP (ref 0–0)
PHOSPHATE SERPL-MCNC: 2.6 MG/DL — SIGNIFICANT CHANGE UP (ref 2.5–4.5)
PLATELET # BLD AUTO: 148 K/UL — LOW (ref 150–400)
POTASSIUM SERPL-MCNC: 4.5 MMOL/L — SIGNIFICANT CHANGE UP (ref 3.5–5.3)
POTASSIUM SERPL-SCNC: 4.5 MMOL/L — SIGNIFICANT CHANGE UP (ref 3.5–5.3)
RBC # BLD: 3.53 M/UL — LOW (ref 4.2–5.8)
RBC # FLD: 14.2 % — SIGNIFICANT CHANGE UP (ref 10.3–14.5)
SODIUM SERPL-SCNC: 137 MMOL/L — SIGNIFICANT CHANGE UP (ref 135–145)
WBC # BLD: 10.56 K/UL — HIGH (ref 3.8–10.5)
WBC # FLD AUTO: 10.56 K/UL — HIGH (ref 3.8–10.5)

## 2023-05-13 PROCEDURE — 99232 SBSQ HOSP IP/OBS MODERATE 35: CPT

## 2023-05-13 PROCEDURE — 71045 X-RAY EXAM CHEST 1 VIEW: CPT | Mod: 26,77

## 2023-05-13 PROCEDURE — 71045 X-RAY EXAM CHEST 1 VIEW: CPT | Mod: 26

## 2023-05-13 RX ORDER — INSULIN GLARGINE 100 [IU]/ML
30 INJECTION, SOLUTION SUBCUTANEOUS
Refills: 0 | Status: DISCONTINUED | OUTPATIENT
Start: 2023-05-14 | End: 2023-05-14

## 2023-05-13 RX ORDER — INSULIN LISPRO 100/ML
16 VIAL (ML) SUBCUTANEOUS
Refills: 0 | Status: DISCONTINUED | OUTPATIENT
Start: 2023-05-13 | End: 2023-05-14

## 2023-05-13 RX ORDER — ACETAMINOPHEN 500 MG
650 TABLET ORAL EVERY 6 HOURS
Refills: 0 | Status: DISCONTINUED | OUTPATIENT
Start: 2023-05-13 | End: 2023-05-14

## 2023-05-13 RX ADMIN — OXYCODONE HYDROCHLORIDE 2.5 MILLIGRAM(S): 5 TABLET ORAL at 04:27

## 2023-05-13 RX ADMIN — HEPARIN SODIUM 5000 UNIT(S): 5000 INJECTION INTRAVENOUS; SUBCUTANEOUS at 13:20

## 2023-05-13 RX ADMIN — SENNA PLUS 2 TABLET(S): 8.6 TABLET ORAL at 22:07

## 2023-05-13 RX ADMIN — Medication 81 MILLIGRAM(S): at 13:20

## 2023-05-13 RX ADMIN — ALBUTEROL 2.5 MILLIGRAM(S): 90 AEROSOL, METERED ORAL at 03:28

## 2023-05-13 RX ADMIN — POLYETHYLENE GLYCOL 3350 17 GRAM(S): 17 POWDER, FOR SOLUTION ORAL at 13:20

## 2023-05-13 RX ADMIN — SODIUM CHLORIDE 4 MILLILITER(S): 9 INJECTION INTRAMUSCULAR; INTRAVENOUS; SUBCUTANEOUS at 08:41

## 2023-05-13 RX ADMIN — FINASTERIDE 5 MILLIGRAM(S): 5 TABLET, FILM COATED ORAL at 13:20

## 2023-05-13 RX ADMIN — HEPARIN SODIUM 5000 UNIT(S): 5000 INJECTION INTRAVENOUS; SUBCUTANEOUS at 05:24

## 2023-05-13 RX ADMIN — OXYCODONE HYDROCHLORIDE 5 MILLIGRAM(S): 5 TABLET ORAL at 08:00

## 2023-05-13 RX ADMIN — LIDOCAINE 1 PATCH: 4 CREAM TOPICAL at 13:20

## 2023-05-13 RX ADMIN — Medication 16 UNIT(S): at 13:19

## 2023-05-13 RX ADMIN — DORNASE ALFA 2.5 MILLIGRAM(S): 1 SOLUTION RESPIRATORY (INHALATION) at 08:41

## 2023-05-13 RX ADMIN — ALBUTEROL 2.5 MILLIGRAM(S): 90 AEROSOL, METERED ORAL at 08:41

## 2023-05-13 RX ADMIN — ALBUTEROL 2.5 MILLIGRAM(S): 90 AEROSOL, METERED ORAL at 15:00

## 2023-05-13 RX ADMIN — Medication 16 UNIT(S): at 17:36

## 2023-05-13 RX ADMIN — CARVEDILOL PHOSPHATE 25 MILLIGRAM(S): 80 CAPSULE, EXTENDED RELEASE ORAL at 17:35

## 2023-05-13 RX ADMIN — OXYCODONE HYDROCHLORIDE 2.5 MILLIGRAM(S): 5 TABLET ORAL at 03:57

## 2023-05-13 RX ADMIN — Medication 10: at 13:18

## 2023-05-13 RX ADMIN — Medication 6: at 17:35

## 2023-05-13 RX ADMIN — SIMVASTATIN 20 MILLIGRAM(S): 20 TABLET, FILM COATED ORAL at 22:07

## 2023-05-13 RX ADMIN — ALBUTEROL 2.5 MILLIGRAM(S): 90 AEROSOL, METERED ORAL at 22:17

## 2023-05-13 RX ADMIN — CARVEDILOL PHOSPHATE 25 MILLIGRAM(S): 80 CAPSULE, EXTENDED RELEASE ORAL at 05:24

## 2023-05-13 RX ADMIN — OXYCODONE HYDROCHLORIDE 5 MILLIGRAM(S): 5 TABLET ORAL at 07:25

## 2023-05-13 RX ADMIN — SODIUM CHLORIDE 4 MILLILITER(S): 9 INJECTION INTRAMUSCULAR; INTRAVENOUS; SUBCUTANEOUS at 15:02

## 2023-05-13 RX ADMIN — SODIUM CHLORIDE 4 MILLILITER(S): 9 INJECTION INTRAMUSCULAR; INTRAVENOUS; SUBCUTANEOUS at 22:17

## 2023-05-13 RX ADMIN — HEPARIN SODIUM 5000 UNIT(S): 5000 INJECTION INTRAVENOUS; SUBCUTANEOUS at 22:07

## 2023-05-13 RX ADMIN — INSULIN GLARGINE 20 UNIT(S): 100 INJECTION, SOLUTION SUBCUTANEOUS at 10:10

## 2023-05-13 RX ADMIN — LIDOCAINE 1 PATCH: 4 CREAM TOPICAL at 19:45

## 2023-05-13 RX ADMIN — Medication 12 UNIT(S): at 10:10

## 2023-05-13 RX ADMIN — SODIUM CHLORIDE 4 MILLILITER(S): 9 INJECTION INTRAMUSCULAR; INTRAVENOUS; SUBCUTANEOUS at 03:28

## 2023-05-13 NOTE — PROGRESS NOTE ADULT - SUBJECTIVE AND OBJECTIVE BOX
Subjective: " I feel oK, I just have pain where the tube goes in when I cough" Pt using IS to 1500. OOB w minimal assist. On RA. Denies CP or SOB. Pt informed about Trulicity copay of $157 and pt willing to pay. Pt continues to be hyperglycemic    Vital Signs:  Vital Signs Last 24 Hrs  T(C): 37 (05-13-23 @ 05:20), Max: 38.2 (05-12-23 @ 20:00)  T(F): 98.6 (05-13-23 @ 05:20), Max: 100.7 (05-12-23 @ 20:00)  HR: 73 (05-13-23 @ 05:20) (73 - 94)  BP: 125/52 (05-13-23 @ 05:20) (102/81 - 157/63)  RR: 19 (05-13-23 @ 05:20) (16 - 26)  SpO2: 98% (05-13-23 @ 05:20) (95% - 100%) on (O2)    Telemetry/Alarms:  General: WN/WD NAD  Neurology: Awake, nonfocal, FLOREZ x 4  Eyes: Scleras clear, PERRLA/ EOMI, Gross vision intact  ENT:Gross hearing intact, grossly patent pharynx, no stridor  Neck: Neck supple, trachea midline, No JVD,   Respiratory: Slight dec BS at left base  CV: RRR, S1S2, no murmurs, rubs or gallops  Abdominal: Soft, NT, ND +BS, No BM x 4 days. No n/v. + flatus  Extremities: No edema, + peripheral pulses  Skin: No Rashes, Hematoma, Ecchymosis  Lymphatic: No Neck, axilla, groin LAD  Psych: Oriented x 3, normal affect  Incisions: left VATS c/d/i.   Tubes: left CT- 140cc/24hrs on WS, no Air leak. CT stripped multiple times, no further output noted.   Relevant labs, radiology and Medications reviewed           CXR no obvious ptx or effusion but some patchy opacities.              9.7    10.56 )-----------( 148      ( 13 May 2023 05:33 )             29.6     05-13    137  |  103  |  33<H>  ----------------------------<  236<H>  4.5   |  21<L>  |  1.30    Ca    8.6      13 May 2023 05:33  Phos  2.6     05-13  Mg     2.20     05-13        MEDICATIONS  (STANDING):  acetaminophen   IVPB .. 1000 milliGRAM(s) IV Intermittent once  albuterol    0.083% 2.5 milliGRAM(s) Nebulizer every 6 hours  aspirin enteric coated 81 milliGRAM(s) Oral daily  carvedilol 25 milliGRAM(s) Oral every 12 hours  dextrose 5%. 1000 milliLiter(s) (50 mL/Hr) IV Continuous <Continuous>  dextrose 5%. 1000 milliLiter(s) (100 mL/Hr) IV Continuous <Continuous>  dextrose 50% Injectable 25 Gram(s) IV Push once  dornase samm Solution 2.5 milliGRAM(s) Inhalation daily  finasteride 5 milliGRAM(s) Oral daily  glucagon  Injectable 1 milliGRAM(s) IntraMuscular once  heparin   Injectable 5000 Unit(s) SubCutaneous every 8 hours  insulin glargine Injectable (LANTUS) 20 Unit(s) SubCutaneous every morning  insulin lispro (ADMELOG) corrective regimen sliding scale   SubCutaneous three times a day before meals  insulin lispro (ADMELOG) corrective regimen sliding scale   SubCutaneous at bedtime  insulin lispro Injectable (ADMELOG) 12 Unit(s) SubCutaneous three times a day before meals  lidocaine   4% Patch 1 Patch Transdermal daily  polyethylene glycol 3350 17 Gram(s) Oral daily  senna 2 Tablet(s) Oral at bedtime  simvastatin 20 milliGRAM(s) Oral at bedtime  sodium chloride 3%  Inhalation 4 milliLiter(s) Inhalation every 6 hours    MEDICATIONS  (PRN):  bisacodyl Suppository 10 milliGRAM(s) Rectal daily PRN Constipation  dextrose Oral Gel 15 Gram(s) Oral once PRN Blood Glucose LESS THAN 70 milliGRAM(s)/deciliter  naloxone Injectable 0.1 milliGRAM(s) IV Push every 3 minutes PRN For ANY of the following changes in patient status:  A. RR LESS THAN 10 breaths per minute, B. Oxygen saturation LESS THAN 90%, C. Sedation score of 6  ondansetron Injectable 4 milliGRAM(s) IV Push every 6 hours PRN Nausea  oxyCODONE    IR 2.5 milliGRAM(s) Oral every 3 hours PRN Moderate Pain (4 - 6)  oxyCODONE    IR 5 milliGRAM(s) Oral every 3 hours PRN Severe Pain (7 - 10)      I&O's Summary    12 May 2023 07:01  -  13 May 2023 07:00  --------------------------------------------------------  IN: 1240 mL / OUT: 1740 mL / NET: -500 mL        Assessment  82y Male  w/ PAST MEDICAL & SURGICAL HISTORY:  HTN (hypertension)      HLD (hyperlipidemia)      DM (diabetes mellitus)      CAD (coronary artery disease)      BPH (benign prostatic hyperplasia)      Glaucoma      admitted with complaints of Patient is a 82y old  Male who presents with a chief complaint of Left Rib Fractures & Hemothorax (12 May 2023 14:42)  81 y/o M w/ Hx of HTN, HLD, DM, CAD s/p Stents (13 years ago) on Aspirin & Plavix who had a mechanical fall at home yesterday. He states that he was in bed and that when he was getting out of bed he fell and hit his left chest wall on the radiator. He denies hitting his head and LOC. Initially he was treated at home by his family but later in the day he became cool and clammy and had a possible syncopal episode, again he denies hitting his head and LOC. At this point he was brought to the ED.     In the ED he had a CT scan and was found to have fractured ribs (7-10) on the left side and a moderate hemothorax. He denies CP, SOB, & dyspnea. States that his left sided chest pain is controlled.     He was admitted directly to the CTICU for observation.  (07 May 2023 10:00)    CHIEF COMPLAINT: Follow up in ICU  for postoperative care of patient who is s/p Robotic LVATS, Lysis of Adhesions and Evacuation of hemothorax    Procedure: Robotic LVATS, Lysis of Adhesions and Evacuation of hemothorax 5/11/2023 5/12- POD 1, CT placed to [facility]. Doing well clinically. Followed by Endocrine for hyperglycemia. Transferred to .     PLAN  Neuro: Pain management  Pulm: Encourage coughing, deep breathing and use of incentive spirometry. Wean off supplemental oxygen as able. Daily CXR.   Cardio: Monitor telemetry/alarms  GI: Tolerating diet. Continue stool softeners. add laxative today  Endo: bld sugars remain high, adjustments made by Endo yesterday. Will f/u any new reccs. Will plan to add Trulicity upon discharge.   Renal: monitor urine output, supplement electrolytes as needed  Vasc: Heparin SC/SCDs for DVT prophylaxis  Heme: Stable H/H. .   ID: Off antibiotics. Stable.  Therapy: OOB/ambulate  Tubes: Monitor Chest tube output. Will remove today per Dr. Santamaria  Disposition: Aim to D/C to home tomorrow.   Discussed with Cardiothoracic Team at AM rounds.

## 2023-05-13 NOTE — PROGRESS NOTE ADULT - SUBJECTIVE AND OBJECTIVE BOX
Chief Complaint: DM 2 with hyperglycemia     History: Patient seen at bedside. Eating meals, had 100% of breakfast, no nausea/vomiting  Hyperglycemia persisting     MEDICATIONS  (STANDING):  acetaminophen   IVPB .. 1000 milliGRAM(s) IV Intermittent once  albuterol    0.083% 2.5 milliGRAM(s) Nebulizer every 6 hours  aspirin enteric coated 81 milliGRAM(s) Oral daily  carvedilol 25 milliGRAM(s) Oral every 12 hours  dextrose 5%. 1000 milliLiter(s) (50 mL/Hr) IV Continuous <Continuous>  dextrose 5%. 1000 milliLiter(s) (100 mL/Hr) IV Continuous <Continuous>  dextrose 50% Injectable 25 Gram(s) IV Push once  dornase samm Solution 2.5 milliGRAM(s) Inhalation daily  finasteride 5 milliGRAM(s) Oral daily  glucagon  Injectable 1 milliGRAM(s) IntraMuscular once  heparin   Injectable 5000 Unit(s) SubCutaneous every 8 hours  insulin glargine Injectable (LANTUS) 20 Unit(s) SubCutaneous every morning  insulin lispro (ADMELOG) corrective regimen sliding scale   SubCutaneous three times a day before meals  insulin lispro (ADMELOG) corrective regimen sliding scale   SubCutaneous at bedtime  insulin lispro Injectable (ADMELOG) 16 Unit(s) SubCutaneous three times a day before meals  lidocaine   4% Patch 1 Patch Transdermal daily  polyethylene glycol 3350 17 Gram(s) Oral daily  senna 2 Tablet(s) Oral at bedtime  simvastatin 20 milliGRAM(s) Oral at bedtime  sodium chloride 3%  Inhalation 4 milliLiter(s) Inhalation every 6 hours    MEDICATIONS  (PRN):  bisacodyl 5 milliGRAM(s) Oral every 12 hours PRN Constipation  dextrose Oral Gel 15 Gram(s) Oral once PRN Blood Glucose LESS THAN 70 milliGRAM(s)/deciliter  naloxone Injectable 0.1 milliGRAM(s) IV Push every 3 minutes PRN For ANY of the following changes in patient status:  A. RR LESS THAN 10 breaths per minute, B. Oxygen saturation LESS THAN 90%, C. Sedation score of 6  ondansetron Injectable 4 milliGRAM(s) IV Push every 6 hours PRN Nausea  oxyCODONE    IR 2.5 milliGRAM(s) Oral every 3 hours PRN Moderate Pain (4 - 6)  oxyCODONE    IR 5 milliGRAM(s) Oral every 3 hours PRN Severe Pain (7 - 10)      Allergies  enalapril (Angioedema; Swelling)    Review of Systems:  HEENT: No pain  Cardiovascular: No chest pain  Respiratory: No SOB  GI: No nausea, vomiting    PHYSICAL EXAM:  VITALS: T(C): 37.3 (05-13-23 @ 13:00)  T(F): 99.1 (05-13-23 @ 13:00), Max: 100.7 (05-12-23 @ 20:00)  HR: 80 (05-13-23 @ 15:04) (70 - 94)  BP: 151/57 (05-13-23 @ 13:00) (125/52 - 157/63)  RR:  (16 - 26)  SpO2:  (95% - 99%)  Wt(kg): --  GENERAL: NAD  EYES: No proptosis, no lid lag, anicteric  HEENT:  Atraumatic, Normocephalic, moist mucous membranes  RESPIRATORY: unlabored respirations     CAPILLARY BLOOD GLUCOSE    POCT Blood Glucose.: 376 mg/dL (13 May 2023 12:20)  POCT Blood Glucose.: 245 mg/dL (13 May 2023 08:30)  POCT Blood Glucose.: 332 mg/dL (12 May 2023 21:16)  POCT Blood Glucose.: 322 mg/dL (12 May 2023 17:25)      05-13    137  |  103  |  33<H>  ----------------------------<  236<H>  4.5   |  21<L>  |  1.30    eGFR: 55<L>    Ca    8.6      05-13  Mg     2.20     05-13  Phos  2.6     05-13      A1C with Estimated Average Glucose Result: 7.5 % (05-09-23 @ 04:00)    Diet, Regular:   Consistent Carbohydrate No Snacks (CSTCHO)  DASH/TLC Sodium & Cholesterol Restricted (DASH) (05-11-23 @ 23:55) [Active]       Chief Complaint: DM 2 with hyperglycemia     History: Patient seen at bedside. Eating meals, had 100% of breakfast, no nausea/vomiting  Hyperglycemia persisting   Noted patient refused Admelog correction this AM with breakfast, accepted pre-meal dose    MEDICATIONS  (STANDING):  acetaminophen   IVPB .. 1000 milliGRAM(s) IV Intermittent once  albuterol    0.083% 2.5 milliGRAM(s) Nebulizer every 6 hours  aspirin enteric coated 81 milliGRAM(s) Oral daily  carvedilol 25 milliGRAM(s) Oral every 12 hours  dextrose 5%. 1000 milliLiter(s) (50 mL/Hr) IV Continuous <Continuous>  dextrose 5%. 1000 milliLiter(s) (100 mL/Hr) IV Continuous <Continuous>  dextrose 50% Injectable 25 Gram(s) IV Push once  dornase samm Solution 2.5 milliGRAM(s) Inhalation daily  finasteride 5 milliGRAM(s) Oral daily  glucagon  Injectable 1 milliGRAM(s) IntraMuscular once  heparin   Injectable 5000 Unit(s) SubCutaneous every 8 hours  insulin glargine Injectable (LANTUS) 20 Unit(s) SubCutaneous every morning  insulin lispro (ADMELOG) corrective regimen sliding scale   SubCutaneous three times a day before meals  insulin lispro (ADMELOG) corrective regimen sliding scale   SubCutaneous at bedtime  insulin lispro Injectable (ADMELOG) 16 Unit(s) SubCutaneous three times a day before meals  lidocaine   4% Patch 1 Patch Transdermal daily  polyethylene glycol 3350 17 Gram(s) Oral daily  senna 2 Tablet(s) Oral at bedtime  simvastatin 20 milliGRAM(s) Oral at bedtime  sodium chloride 3%  Inhalation 4 milliLiter(s) Inhalation every 6 hours    MEDICATIONS  (PRN):  bisacodyl 5 milliGRAM(s) Oral every 12 hours PRN Constipation  dextrose Oral Gel 15 Gram(s) Oral once PRN Blood Glucose LESS THAN 70 milliGRAM(s)/deciliter  naloxone Injectable 0.1 milliGRAM(s) IV Push every 3 minutes PRN For ANY of the following changes in patient status:  A. RR LESS THAN 10 breaths per minute, B. Oxygen saturation LESS THAN 90%, C. Sedation score of 6  ondansetron Injectable 4 milliGRAM(s) IV Push every 6 hours PRN Nausea  oxyCODONE    IR 2.5 milliGRAM(s) Oral every 3 hours PRN Moderate Pain (4 - 6)  oxyCODONE    IR 5 milliGRAM(s) Oral every 3 hours PRN Severe Pain (7 - 10)      Allergies  enalapril (Angioedema; Swelling)    Review of Systems:  HEENT: No pain  Cardiovascular: No chest pain  Respiratory: No SOB  GI: No nausea, vomiting    PHYSICAL EXAM:  VITALS: T(C): 37.3 (05-13-23 @ 13:00)  T(F): 99.1 (05-13-23 @ 13:00), Max: 100.7 (05-12-23 @ 20:00)  HR: 80 (05-13-23 @ 15:04) (70 - 94)  BP: 151/57 (05-13-23 @ 13:00) (125/52 - 157/63)  RR:  (16 - 26)  SpO2:  (95% - 99%)  Wt(kg): --  GENERAL: NAD  EYES: No proptosis, no lid lag, anicteric  HEENT:  Atraumatic, Normocephalic, moist mucous membranes  RESPIRATORY: unlabored respirations     CAPILLARY BLOOD GLUCOSE    POCT Blood Glucose.: 376 mg/dL (13 May 2023 12:20)  POCT Blood Glucose.: 245 mg/dL (13 May 2023 08:30)  POCT Blood Glucose.: 332 mg/dL (12 May 2023 21:16)  POCT Blood Glucose.: 322 mg/dL (12 May 2023 17:25)      05-13    137  |  103  |  33<H>  ----------------------------<  236<H>  4.5   |  21<L>  |  1.30    eGFR: 55<L>    Ca    8.6      05-13  Mg     2.20     05-13  Phos  2.6     05-13      A1C with Estimated Average Glucose Result: 7.5 % (05-09-23 @ 04:00)    Diet, Regular:   Consistent Carbohydrate No Snacks (CSTCHO)  DASH/TLC Sodium & Cholesterol Restricted (DASH) (05-11-23 @ 23:55) [Active]

## 2023-05-14 ENCOUNTER — TRANSCRIPTION ENCOUNTER (OUTPATIENT)
Age: 83
End: 2023-05-14

## 2023-05-14 VITALS — OXYGEN SATURATION: 99 %

## 2023-05-14 LAB
ANION GAP SERPL CALC-SCNC: 10 MMOL/L — SIGNIFICANT CHANGE UP (ref 7–14)
BUN SERPL-MCNC: 29 MG/DL — HIGH (ref 7–23)
CALCIUM SERPL-MCNC: 8.5 MG/DL — SIGNIFICANT CHANGE UP (ref 8.4–10.5)
CHLORIDE SERPL-SCNC: 103 MMOL/L — SIGNIFICANT CHANGE UP (ref 98–107)
CO2 SERPL-SCNC: 24 MMOL/L — SIGNIFICANT CHANGE UP (ref 22–31)
CREAT SERPL-MCNC: 1.27 MG/DL — SIGNIFICANT CHANGE UP (ref 0.5–1.3)
EGFR: 57 ML/MIN/1.73M2 — LOW
GLUCOSE BLDC GLUCOMTR-MCNC: 191 MG/DL — HIGH (ref 70–99)
GLUCOSE BLDC GLUCOMTR-MCNC: 319 MG/DL — HIGH (ref 70–99)
GLUCOSE SERPL-MCNC: 156 MG/DL — HIGH (ref 70–99)
HCT VFR BLD CALC: 28.8 % — LOW (ref 39–50)
HGB BLD-MCNC: 9.3 G/DL — LOW (ref 13–17)
MCHC RBC-ENTMCNC: 27.4 PG — SIGNIFICANT CHANGE UP (ref 27–34)
MCHC RBC-ENTMCNC: 32.3 GM/DL — SIGNIFICANT CHANGE UP (ref 32–36)
MCV RBC AUTO: 84.7 FL — SIGNIFICANT CHANGE UP (ref 80–100)
NRBC # BLD: 0 /100 WBCS — SIGNIFICANT CHANGE UP (ref 0–0)
NRBC # FLD: 0 K/UL — SIGNIFICANT CHANGE UP (ref 0–0)
PLATELET # BLD AUTO: 165 K/UL — SIGNIFICANT CHANGE UP (ref 150–400)
POTASSIUM SERPL-MCNC: 4.1 MMOL/L — SIGNIFICANT CHANGE UP (ref 3.5–5.3)
POTASSIUM SERPL-SCNC: 4.1 MMOL/L — SIGNIFICANT CHANGE UP (ref 3.5–5.3)
RBC # BLD: 3.4 M/UL — LOW (ref 4.2–5.8)
RBC # FLD: 14 % — SIGNIFICANT CHANGE UP (ref 10.3–14.5)
SODIUM SERPL-SCNC: 137 MMOL/L — SIGNIFICANT CHANGE UP (ref 135–145)
WBC # BLD: 9.23 K/UL — SIGNIFICANT CHANGE UP (ref 3.8–10.5)
WBC # FLD AUTO: 9.23 K/UL — SIGNIFICANT CHANGE UP (ref 3.8–10.5)

## 2023-05-14 PROCEDURE — 99232 SBSQ HOSP IP/OBS MODERATE 35: CPT

## 2023-05-14 PROCEDURE — 71045 X-RAY EXAM CHEST 1 VIEW: CPT | Mod: 26

## 2023-05-14 RX ORDER — ASPIRIN/CALCIUM CARB/MAGNESIUM 324 MG
1 TABLET ORAL
Qty: 28 | Refills: 0
Start: 2023-05-14

## 2023-05-14 RX ORDER — OXYCODONE HYDROCHLORIDE 5 MG/1
1 TABLET ORAL
Qty: 20 | Refills: 0
Start: 2023-05-14 | End: 2023-05-18

## 2023-05-14 RX ORDER — DULAGLUTIDE 4.5 MG/.5ML
0.75 INJECTION, SOLUTION SUBCUTANEOUS
Qty: 5 | Refills: 0
Start: 2023-05-14 | End: 2023-06-12

## 2023-05-14 RX ORDER — SIMVASTATIN 20 MG/1
1 TABLET, FILM COATED ORAL
Qty: 28 | Refills: 0
Start: 2023-05-14

## 2023-05-14 RX ORDER — POLYETHYLENE GLYCOL 3350 17 G/17G
17 POWDER, FOR SOLUTION ORAL
Qty: 0 | Refills: 0 | DISCHARGE
Start: 2023-05-14

## 2023-05-14 RX ORDER — CARVEDILOL PHOSPHATE 80 MG/1
1 CAPSULE, EXTENDED RELEASE ORAL
Qty: 28 | Refills: 0
Start: 2023-05-14

## 2023-05-14 RX ORDER — INSULIN LISPRO 100/ML
20 VIAL (ML) SUBCUTANEOUS
Qty: 60 | Refills: 0
Start: 2023-05-14

## 2023-05-14 RX ORDER — CLOPIDOGREL BISULFATE 75 MG/1
1 TABLET, FILM COATED ORAL
Qty: 28 | Refills: 0
Start: 2023-05-14

## 2023-05-14 RX ORDER — METFORMIN HYDROCHLORIDE 850 MG/1
1 TABLET ORAL
Qty: 28 | Refills: 0
Start: 2023-05-14

## 2023-05-14 RX ORDER — SENNA PLUS 8.6 MG/1
2 TABLET ORAL
Qty: 0 | Refills: 0 | DISCHARGE
Start: 2023-05-14

## 2023-05-14 RX ORDER — NALOXONE HYDROCHLORIDE 4 MG/.1ML
4 SPRAY NASAL
Qty: 1 | Refills: 0
Start: 2023-05-14

## 2023-05-14 RX ORDER — INSULIN GLARGINE 100 [IU]/ML
30 INJECTION, SOLUTION SUBCUTANEOUS
Qty: 30 | Refills: 0
Start: 2023-05-14

## 2023-05-14 RX ADMIN — ALBUTEROL 2.5 MILLIGRAM(S): 90 AEROSOL, METERED ORAL at 15:42

## 2023-05-14 RX ADMIN — CARVEDILOL PHOSPHATE 25 MILLIGRAM(S): 80 CAPSULE, EXTENDED RELEASE ORAL at 05:42

## 2023-05-14 RX ADMIN — Medication 8: at 12:46

## 2023-05-14 RX ADMIN — SODIUM CHLORIDE 4 MILLILITER(S): 9 INJECTION INTRAMUSCULAR; INTRAVENOUS; SUBCUTANEOUS at 03:59

## 2023-05-14 RX ADMIN — SODIUM CHLORIDE 4 MILLILITER(S): 9 INJECTION INTRAMUSCULAR; INTRAVENOUS; SUBCUTANEOUS at 15:43

## 2023-05-14 RX ADMIN — FINASTERIDE 5 MILLIGRAM(S): 5 TABLET, FILM COATED ORAL at 13:15

## 2023-05-14 RX ADMIN — Medication 16 UNIT(S): at 08:45

## 2023-05-14 RX ADMIN — Medication 650 MILLIGRAM(S): at 04:20

## 2023-05-14 RX ADMIN — SODIUM CHLORIDE 4 MILLILITER(S): 9 INJECTION INTRAMUSCULAR; INTRAVENOUS; SUBCUTANEOUS at 10:05

## 2023-05-14 RX ADMIN — Medication 2: at 08:44

## 2023-05-14 RX ADMIN — Medication 16 UNIT(S): at 12:47

## 2023-05-14 RX ADMIN — ALBUTEROL 2.5 MILLIGRAM(S): 90 AEROSOL, METERED ORAL at 10:05

## 2023-05-14 RX ADMIN — Medication 81 MILLIGRAM(S): at 13:14

## 2023-05-14 RX ADMIN — HEPARIN SODIUM 5000 UNIT(S): 5000 INJECTION INTRAVENOUS; SUBCUTANEOUS at 05:42

## 2023-05-14 RX ADMIN — INSULIN GLARGINE 30 UNIT(S): 100 INJECTION, SOLUTION SUBCUTANEOUS at 08:46

## 2023-05-14 RX ADMIN — ALBUTEROL 2.5 MILLIGRAM(S): 90 AEROSOL, METERED ORAL at 03:59

## 2023-05-14 RX ADMIN — POLYETHYLENE GLYCOL 3350 17 GRAM(S): 17 POWDER, FOR SOLUTION ORAL at 13:15

## 2023-05-14 RX ADMIN — LIDOCAINE 1 PATCH: 4 CREAM TOPICAL at 01:15

## 2023-05-14 RX ADMIN — Medication 650 MILLIGRAM(S): at 04:50

## 2023-05-14 NOTE — DISCHARGE NOTE PROVIDER - NSDCCPTREATMENT_GEN_ALL_CORE_FT
PRINCIPAL PROCEDURE  Procedure: Robotic assistance in thoracoscopic procedure  Findings and Treatment: FB, Robotic LVATS, Lysis of Adhesions and Evacuation of hemothorax

## 2023-05-14 NOTE — DISCHARGE NOTE PROVIDER - NSDCCPCAREPLAN_GEN_ALL_CORE_FT
PRINCIPAL DISCHARGE DIAGNOSIS  Diagnosis: Hemothorax, left  Assessment and Plan of Treatment:       SECONDARY DISCHARGE DIAGNOSES  Diagnosis: Closed rib fracture  Assessment and Plan of Treatment:

## 2023-05-14 NOTE — PROGRESS NOTE ADULT - SUBJECTIVE AND OBJECTIVE BOX
Chief Complaint: DM 2 with hyperglycemia     History: Patient seen at bedside. Reports he is eating meals, denies n/v, denies s/s of hypoglycemia  Noted with hyperglycemia at lunch today to 319 mg/dl, likely in setting of eating banana prior  Patient is planned for discharge today per primary team  Patient endorses taking Lantus 30 units daily at home (normally takes at night), Humalog 20 units BID (typically only eats 2x per day), and Metformin 500 mg daily (only takes once)  He is agreeable to GLP1RA, per primary team Trulicity is covered by insurance (with co-pay, which patient agrees to).   Patient was educated on use of Trulicity PEN, as well as on side effects and benefits, patient reports understanding and performed successful return demonstration of sample Trulicity pen    MEDICATIONS  (STANDING):  acetaminophen   IVPB .. 1000 milliGRAM(s) IV Intermittent once  albuterol    0.083% 2.5 milliGRAM(s) Nebulizer every 6 hours  aspirin enteric coated 81 milliGRAM(s) Oral daily  carvedilol 25 milliGRAM(s) Oral every 12 hours  dextrose 5%. 1000 milliLiter(s) (50 mL/Hr) IV Continuous <Continuous>  dextrose 5%. 1000 milliLiter(s) (100 mL/Hr) IV Continuous <Continuous>  dextrose 50% Injectable 25 Gram(s) IV Push once  dornase samm Solution 2.5 milliGRAM(s) Inhalation daily  finasteride 5 milliGRAM(s) Oral daily  glucagon  Injectable 1 milliGRAM(s) IntraMuscular once  heparin   Injectable 5000 Unit(s) SubCutaneous every 8 hours  insulin glargine Injectable (LANTUS) 30 Unit(s) SubCutaneous <User Schedule>  insulin lispro (ADMELOG) corrective regimen sliding scale   SubCutaneous three times a day before meals  insulin lispro (ADMELOG) corrective regimen sliding scale   SubCutaneous at bedtime  insulin lispro Injectable (ADMELOG) 16 Unit(s) SubCutaneous three times a day before meals  lidocaine   4% Patch 1 Patch Transdermal daily  polyethylene glycol 3350 17 Gram(s) Oral daily  senna 2 Tablet(s) Oral at bedtime  simvastatin 20 milliGRAM(s) Oral at bedtime  sodium chloride 3%  Inhalation 4 milliLiter(s) Inhalation every 6 hours    MEDICATIONS  (PRN):  acetaminophen     Tablet .. 650 milliGRAM(s) Oral every 6 hours PRN Temp greater or equal to 38C (100.4F), Mild Pain (1 - 3)  bisacodyl 5 milliGRAM(s) Oral every 12 hours PRN Constipation  dextrose Oral Gel 15 Gram(s) Oral once PRN Blood Glucose LESS THAN 70 milliGRAM(s)/deciliter  naloxone Injectable 0.1 milliGRAM(s) IV Push every 3 minutes PRN For ANY of the following changes in patient status:  A. RR LESS THAN 10 breaths per minute, B. Oxygen saturation LESS THAN 90%, C. Sedation score of 6  ondansetron Injectable 4 milliGRAM(s) IV Push every 6 hours PRN Nausea  oxyCODONE    IR 2.5 milliGRAM(s) Oral every 3 hours PRN Moderate Pain (4 - 6)  oxyCODONE    IR 5 milliGRAM(s) Oral every 3 hours PRN Severe Pain (7 - 10)    Allergies  enalapril (Angioedema; Swelling)    Review of Systems:  HEENT: No pain  Cardiovascular: No chest pain  Respiratory: No SOB  GI: No nausea, vomiting    PHYSICAL EXAM:  VITALS: T(C): 37 (05-14-23 @ 05:40)  T(F): 98.6 (05-14-23 @ 05:40), Max: 100.8 (05-13-23 @ 18:20)  HR: 72 (05-14-23 @ 10:07) (69 - 86)  BP: 147/53 (05-14-23 @ 05:40) (112/40 - 155/66)  RR:  (16 - 18)  SpO2:  (95% - 100%)  Wt(kg): --  GENERAL: NAD  EYES: No proptosis, no lid lag, anicteric  HEENT:  Atraumatic, Normocephalic, moist mucous membranes  RESPIRATORY: unlabored respirations     CAPILLARY BLOOD GLUCOSE    POCT Blood Glucose.: 319 mg/dL (14 May 2023 12:36)  POCT Blood Glucose.: 191 mg/dL (14 May 2023 08:24)  POCT Blood Glucose.: 166 mg/dL (13 May 2023 21:19)  POCT Blood Glucose.: 295 mg/dL (13 May 2023 17:05)      05-14    137  |  103  |  29<H>  ----------------------------<  156<H>  4.1   |  24  |  1.27    eGFR: 57<L>    Ca    8.5      05-14  Mg     2.20     05-13  Phos  2.6     05-13        A1C with Estimated Average Glucose Result: 7.5 % (05-09-23 @ 04:00)    Diet, Regular:   Consistent Carbohydrate No Snacks (CSTCHO)  DASH/TLC Sodium & Cholesterol Restricted (DASH) (05-11-23 @ 23:55) [Active]

## 2023-05-14 NOTE — DISCHARGE NOTE NURSING/CASE MANAGEMENT/SOCIAL WORK - PATIENT PORTAL LINK FT
You can access the FollowMyHealth Patient Portal offered by [facility] by registering at the following website: [email or web address]. By joining [facility]’s FollowMyHealth portal, you will also be able to view your health information using other applications (apps) compatible with our system.

## 2023-05-14 NOTE — DISCHARGE NOTE PROVIDER - NSDCMRMEDTOKEN_GEN_ALL_CORE_FT
semaglutide 0.25 mg/0.5 mL (0.25 mg dose) subcutaneous solution: 0.25 milligram(s) subcutaneously once a week  Trulicity Pen 0.75 mg/0.5 mL subcutaneous solution: 0.75 milligram(s) subcutaneously once a week   aspirin 81 mg oral delayed release tablet: 1 tab(s) orally once a day  carvedilol 25 mg oral tablet: 1 tab(s) orally every 12 hours  Chest Xray AP/PA: Please use this prescription should you choose to go to an outpatient imaging facility of your choice to have your follow up chest Xray completed. Please know that should you go to an imaging center of your choice to have your follow up chest Xray completed, it should not be completed any earlier than two days prior to your follow up appointment.    Please fax results to Dr. Santamaria @ [phone]  ICD: J94.2  clopidogrel 75 mg oral tablet: 1 tab(s) orally once a day  HumaLOG KwikPen 100 units/mL injectable solution: 20 unit(s) injectable 3 times a day (before meals)  Lantus Solostar Pen 100 units/mL subcutaneous solution: 30 unit(s) subcutaneous once a day In the morning  metFORMIN 500 mg oral tablet: 1 tab(s) orally once a day  naloxone 4 mg/0.1 mL nasal spray: 4 milligram(s) intranasally prn as needed for Oversedation  oxyCODONE 5 mg oral tablet: 1 tab(s) orally every 6 hours as needed for Moderate to Severe Pain MDD: 4 tab(s)  polyethylene glycol 3350 oral powder for reconstitution: 17 gram(s) orally once a day  senna leaf extract oral tablet: 2 tab(s) orally once a day (at bedtime)  simvastatin 20 mg oral tablet: 1 tab(s) orally once a day (at bedtime)  Trulicity Pen 0.75 mg/0.5 mL subcutaneous solution: 0.75 milligram(s) subcutaneously once a week

## 2023-05-14 NOTE — PROGRESS NOTE ADULT - ASSESSMENT
82M Type 2 DM HbA1c 7.5% s/p fall with resulting L hemothorax and rib fractures. History of CAD s/p stents.    1. Type 2 DM  HbA1c 7.5% at goal for age (7-8%)  Home regimen: Lantus 25 units daily per doctor office, patient reports taking 30 units daily  Humalog 20 units BID, only eats twice per day  Metformin he thinks 500mg once daily    While inpatient:  BG target 100-180 mg/dl  Increase Lantus to 30 units SQ daily in AM (home dose)  Increase Admelog to 16 units SQ TID before meals (Hold if NPO/skips meal)  Continue Admelog MODERATE dose correctional scale before meals, moderate at bedtime  Check BG before meals and bedtime  Consistent carbohydrate diet  Hypoglycemia protocol  DM Education: May need education on GLP1RA use (depending on insurance coverage)     Discharge Plan:  Resume basal/bolus insulin PENS (Lantus and Humalog) doses TBD  Resume Metformin 500mg once daily (GFR 49)  Consider adding cardioprotective medication for history of CAD - GLP1RA or SGLT2i  Patient is amenable to GLP1RA  Please send Trulicity 0.75 mg SQ weekly or Ozempic 0.25 mg SQ weekly to pharmacy and check insurance coverage   Once coverage is determined, endo team will teach patient how to inject the GLP 1 that is preferred, likely would be able to use Ozempic easily given it is similar to insulin pen  Follow up with PCP in [place]    2. HTN  BP goal < 130/80  On Carvedilol  consider ACEI/ARB    3. Hyperlipidemia  Continue Simvastatin 20mg daily if no contraindications     Lillian Fall  Nurse Practitioner  Division of Endocrinology & Diabetes  In house pager #[phone]/long range pager #[phone]    If before 9AM or after 6PM, or on weekends/holidays, please call endocrine answering service for assistance ([phone]).  For nonurgent matters email [email or web address] for assistance. 
82M Type 2 DM HbA1c 7.5% s/p fall with resulting L hemothorax and rib fractures. History of CAD s/p stents.    1) Type 2 DM  HbA1c 7.5% at goal for age (7-8%)  Home regimen: Lantus 25 units daily per doctor office, patient reports taking 30 units daily  Humalog 20 units BID, only eats twice per day  metformin he thinks 500mg once daily    Inpatient with some hyperglycemia noted.  Recommend while inpatient:  continue Lantus 20 units qAM as fasting glucose is well controlled. If NPO past midnight can use 80% of usual dose (16 units).  Increase Admelog to 12/12/12 units premeal for now - please do not hold dose for normal glucose. As long as patient is eating can give dose as ordered.  Continue moderate Admelog scale premeal and moderate bedtime scale    DC plan: Lantus and Humalog doses TBD  resume metformin 500mg once daily (GFR 49)  Cosnider adding cardioprotective medication for history of CAD - GLP1RA or SGLT2i  Patient is amenable to GLP1ra  Please send trulicity 0.75 mg sq q week to [facility] and check isurance coverage,  If not covered please try ozempic 0.25 mg sq q week  Once coverage is determined, endo team will teach patient how to inject the GLP 1 that is preferred  Follow up with PCP in [place]    2) HTN  BP goal < 130/80  on carvedilol  consider ACEI/ARB    3) Hyperlipidemia  continue simvastatin 30mg    Victoria Rose  Nurse Practitioner  Division of Endocrinology & Diabetes  Available via  Teams      If after 6PM or before 9AM, or on weekends/holidays, please call endocrine answering service for assistance ([phone]).  For nonurgent matters email [email or web address] for assistance.
82M Type 2 DM HbA1c 7.5% s/p fall with resulting L hemothorax and rib fractures. History of CAD s/p stents.    1. Type 2 DM  HbA1c 7.5% at goal for age (7-8%)  Home regimen: Lantus 25 units daily per doctor office, patient reports taking 30 units daily  Humalog 20 units BID, only eats twice per day  Metformin he thinks 500mg once daily    While inpatient:  BG target 100-180 mg/dl  Continue Lantus 30 units SQ daily in AM (9:00), reviewed with patient taking in the morning, he agrees   Continue Admelog 16 units SQ TID before meals (Hold if NPO/skips meal)  Suspect hyperglycemia r/t eating prior  Continue Admelog MODERATE dose correctional scale before meals, moderate at bedtime  Check BG before meals and bedtime  Consistent carbohydrate diet  Hypoglycemia protocol  DM Education: Patient educated on use of GLP1RA (Trulicity PEN) on 5/14    Discharge Plan:  Resume basal/bolus insulin PENS (Lantus and Humalog): recommend Lantus 30 units ONCE daily in AM (9:00), and Humalog 20 units SQ TID before meals (Hold if skips meal)  Resume Metformin 500mg PO once daily (GFR 57) - Keep as once daily for now, no GI side effects so it can be increased as outpatient if needed, patient reports he will discuss with PCP  PLUS Trulicity 0.75 mg SQ weekly   Patient denies hx of pancreatitis, alcohol use or retinopathy. Last saw ophthalmologist in January 2023. Reviewed side effects and benefits of GLP1RA and importance of titration as outpatient (increase to 1.5 mg weekly after one month, likely will also need to reduce insulin dosing)  Follow up with PCP in [place]  If remaining in [place]: [facility], [address] [place] [address], [address], [facility] [address], [phone]    2. HTN  BP goal < 130/80  On Carvedilol  consider ACEI/ARB    3. Hyperlipidemia  Continue Simvastatin 20mg daily if no contraindications     Reviewed DC recs with primary team  Greater than 55 min spent in assessment, coordination of care and education for uncontrolled DM 2  Lillian Fall  Nurse Practitioner  Division of Endocrinology & Diabetes  In house pager #[phone]/long range pager #[phone]    If before 9AM or after 6PM, or on weekends/holidays, please call endocrine answering service for assistance ([phone]).  For nonurgent matters email [email or web address] for assistance. 
82M Type 2 DM HbA1c 7.5% s/p fall with resulting L hemothorax and rib fractures. History of CAD s/p stents.    1) Type 2 DM  HbA1c 7.5% at goal for age (7-8%)  Home regimen: Lantus 25 units daily per doctor office, patient reports taking 30 units daily  Humalog 20 units BID, only eats twice per day  metformin he thinks 500mg once daily    Inpatient with some hyperglycemia noted.  Recommend while inpatient:  Patient was NPO yesterday for procedure and only received Lantus 10 units in AM.  Having hyperglycemia today.  Diet has advanced, patient is eating.  Would continue current regimen.    continue Lantus 20 units qAM as fasting glucose is well controlled. If NPO past midnight can use 80% of usual dose (16 units).  Continue Admelog to 12/12/12 units premeal for now - please do not hold dose for normal glucose. As long as patient is eating can give dose as ordered.  Continue moderate Admelog scale premeal and moderate bedtime scale    DC plan: Lantus and Humalog doses TBD  resume metformin 500mg once daily (GFR 49)  Cosnider adding cardioprotective medication for history of CAD - GLP1RA or SGLT2i  Patient is amenable to GLP1ra  Please send trulicity 0.75 mg sq q week to [facility] and check isurance coverage,  If not covered please try ozempic 0.25 mg sq q week  Once coverage is determined, endo team will teach patient how to inject the GLP 1 that is preferred  Follow up with PCP in [place]    2) HTN  BP goal < 130/80  on carvedilol  consider ACEI/ARB    3) Hyperlipidemia  continue simvastatin 30mg    Victoria Rose  Nurse Practitioner  Division of Endocrinology & Diabetes  Available via  Teams      If after 6PM or before 9AM, or on weekends/holidays, please call endocrine answering service for assistance ([phone]).  For nonurgent matters email [email or web address] for assistance.

## 2023-05-14 NOTE — DISCHARGE NOTE PROVIDER - CARE PROVIDER_API CALL
Conchita Art)  Surgery; Thoracic Surgery  [phone] [place] [address]  Phone: [phone]  Fax: [phone]  Established Patient  Follow Up Time: 2 weeks

## 2023-05-14 NOTE — DISCHARGE NOTE PROVIDER - NSDCFUADDAPPT_GEN_ALL_CORE_FT
See Dr Art  in 2 weeks-call for an appointment and bring a new chest X-ray with you.   - See Dr Art  in 2 weeks-call for an appointment and bring a new chest X-ray with you.    - Please make sure to follow up with your primary care provider as well within one to two weeks as you were started on new diabetic medication

## 2023-05-14 NOTE — DISCHARGE NOTE NURSING/CASE MANAGEMENT/SOCIAL WORK - NSDCPEFALRISK_GEN_ALL_CORE
For information on Fall & Injury Prevention, visit: [email or web address] OR  [email or web address] OR  https://www.cdc.gov/steadi/patient.html

## 2023-05-14 NOTE — PROGRESS NOTE ADULT - PROVIDER SPECIALTY LIST ADULT
Endocrinology
CT Surgery
Critical Care
Critical Care
Pain Medicine
Anesthesia
Critical Care
Pain Medicine
Endocrinology

## 2023-05-14 NOTE — PROGRESS NOTE ADULT - REASON FOR ADMISSION
Left Rib Fractures & Hemothorax

## 2023-05-14 NOTE — DISCHARGE NOTE PROVIDER - HOSPITAL COURSE
83 y/o M w/ Hx of HTN, HLD, DM, CAD s/p Stents (13 years ago) on Aspirin & Plavix had a mechanical fall at home.  He states that he was in bed and that when he was getting out of bed he fell and hit his left chest wall on the radiator. He denies hitting his head and LOC. Initially he was treated at home by his family but later in the day he became cool and clammy and had a possible syncopal episode, again he denies hitting his head and LOC. At this point he was brought to the ED. In the ED he had a CT scan and was found to have fractured ribs (7-10) on the left side and a moderate hemothorax. He denies CP, SOB, & dyspnea. States that his left sided chest pain is controlled. He was admitted directly to the CTICU for observation.  His Plavix was held and on 5/11 he underwent a L VATS, evacuation of hemothorax.  Post-op he had issues with is blood glucose managemtn and endocrinology adjusted his medications.  HIs chest tube was removed on 5/13 and he was for discharge home after that. 83 y/o M w/ Hx of HTN, HLD, DM, CAD s/p Stents (13 years ago) on Aspirin & Plavix had a mechanical fall at home.  He states that he was in bed and that when he was getting out of bed he fell and hit his left chest wall on the radiator. He denies hitting his head and LOC. Initially he was treated at home by his family but later in the day he became cool and clammy and had a possible syncopal episode, again he denies hitting his head and LOC. At this point he was brought to the ED. In the ED he had a CT scan and was found to have fractured ribs (7-10) on the left side and a moderate hemothorax. He denies CP, SOB, & dyspnea. States that his left sided chest pain is controlled. He was admitted directly to the CTICU for observation.  His Plavix was held and on 5/11 he underwent a L VATS, evacuation of hemothorax.  Post-op he had issues with is blood glucose managemtn and endocrinology adjusted his medications.  Pt's chest tube was removed on 5/13 and he was for discharge home after that.     At this point in time patient is stable for discharge to home per patient's attending. Patient will follow up with Dr. Art within 1-2 weeks.

## 2023-05-16 LAB
CULTURE RESULTS: SIGNIFICANT CHANGE UP
SPECIMEN SOURCE: SIGNIFICANT CHANGE UP

## 2023-05-19 LAB — NON-GYNECOLOGICAL CYTOLOGY STUDY: SIGNIFICANT CHANGE UP

## 2023-05-22 ENCOUNTER — RESULT REVIEW (OUTPATIENT)
Age: 83
End: 2023-05-22

## 2023-05-22 ENCOUNTER — NON-APPOINTMENT (OUTPATIENT)
Age: 83
End: 2023-05-22

## 2023-05-22 ENCOUNTER — APPOINTMENT (OUTPATIENT)
Dept: THORACIC SURGERY | Facility: CLINIC | Age: 83
End: 2023-05-22
Payer: MEDICARE

## 2023-05-22 ENCOUNTER — APPOINTMENT (OUTPATIENT)
Dept: RADIOLOGY | Facility: HOSPITAL | Age: 83
End: 2023-05-22

## 2023-05-22 ENCOUNTER — OUTPATIENT (OUTPATIENT)
Dept: OUTPATIENT SERVICES | Facility: HOSPITAL | Age: 83
LOS: 1 days | End: 2023-05-22
Payer: MEDICARE

## 2023-05-22 VITALS
TEMPERATURE: 98.2 F | BODY MASS INDEX: 26.67 KG/M2 | OXYGEN SATURATION: 98 % | SYSTOLIC BLOOD PRESSURE: 168 MMHG | HEIGHT: 68 IN | HEART RATE: 67 BPM | WEIGHT: 176 LBS | RESPIRATION RATE: 16 BRPM | DIASTOLIC BLOOD PRESSURE: 78 MMHG

## 2023-05-22 DIAGNOSIS — J94.2 HEMOTHORAX: ICD-10-CM

## 2023-05-22 PROCEDURE — 71046 X-RAY EXAM CHEST 2 VIEWS: CPT | Mod: 26

## 2023-05-22 PROCEDURE — 99024 POSTOP FOLLOW-UP VISIT: CPT

## 2023-05-22 RX ORDER — SIMVASTATIN 20 MG/1
20 TABLET, FILM COATED ORAL
Refills: 0 | Status: ACTIVE | COMMUNITY

## 2023-05-22 RX ORDER — INSULIN GLARGINE 100 [IU]/ML
100 INJECTION, SOLUTION SUBCUTANEOUS
Refills: 0 | Status: ACTIVE | COMMUNITY

## 2023-05-22 RX ORDER — NALXONE HYDROCHLORIDE 0.4 MG/ML
0.4 INJECTION INTRAMUSCULAR; INTRAVENOUS; SUBCUTANEOUS
Refills: 0 | Status: ACTIVE | COMMUNITY

## 2023-05-22 RX ORDER — LORATADINE 10 MG
17 TABLET,DISINTEGRATING ORAL
Refills: 0 | Status: ACTIVE | COMMUNITY

## 2023-05-22 RX ORDER — CLOPIDOGREL BISULFATE 75 MG/1
75 TABLET, FILM COATED ORAL
Refills: 0 | Status: ACTIVE | COMMUNITY

## 2023-05-22 RX ORDER — METFORMIN HYDROCHLORIDE 500 MG/1
500 TABLET, COATED ORAL
Refills: 0 | Status: ACTIVE | COMMUNITY

## 2023-05-22 RX ORDER — OXYCODONE 5 MG/1
5 TABLET ORAL
Refills: 0 | Status: ACTIVE | COMMUNITY

## 2023-05-22 RX ORDER — CARVEDILOL 25 MG/1
25 TABLET, FILM COATED ORAL
Refills: 0 | Status: ACTIVE | COMMUNITY

## 2023-05-22 RX ORDER — DULAGLUTIDE 0.75 MG/.5ML
0.75 INJECTION, SOLUTION SUBCUTANEOUS
Refills: 0 | Status: ACTIVE | COMMUNITY

## 2023-05-22 RX ORDER — ASPIRIN 81 MG
81 TABLET, DELAYED RELEASE (ENTERIC COATED) ORAL
Refills: 0 | Status: ACTIVE | COMMUNITY

## 2023-05-22 RX ORDER — INSULIN LISPRO 100 [IU]/ML
100 INJECTION, SOLUTION INTRAVENOUS; SUBCUTANEOUS
Refills: 0 | Status: ACTIVE | COMMUNITY

## 2023-05-22 NOTE — ASSESSMENT
[FreeTextEntry1] : Mr. JEM FRANCIS, 82 year old male, never smoker, w/ hx of DMII, HTN, who was admitted on 05/07 after a mechanical fall at home. He was in bed and lost his footing and fell over the radiator and hit his head  and had loss of consciousness and presented to the ED.  He was noted to have fractured ribs VII through X on the left side as well as a moderate hemothorax.\par \par Now s/p Robotic left VATS, evacuation of hemothorax, extensive lysis of adhesions on 5/11/23. Path revealed negative for pleural fluid. \par \par CXR today---- reviewed, pt feels much better. \par \par I have reviewed the patient's medical records and diagnostic images at time of this office consultation and have made the following recommendation:\par 1. Path reviewed with pt. Pt is improving since surgery, RTC in 6 wks with CXR \par \par \par I, Dr. HERNÁNDEZ, TERRELL EVERARDO, personally performed the evaluation and management (E/M) services for this established patient who presents today with (a) new problem(s)/exacerbation of (an) existing condition(s).  That E/M includes conducting the examination, assessing all new/exacerbated conditions, and establishing a new plan of care.  Today, my ACP, Claudia Mcgee NP was here to observe my evaluation and management services for this new problem/exacerbated condition to be followed going forward.\par \par \par

## 2023-05-22 NOTE — REASON FOR VISIT
Pt called had to cancel appt on 5/31 with Dr Osborn due to her spouse has a medial emergency. Pt says she hopes sometime soon she can reschedule   [Spouse] : spouse [de-identified] : Robotic left VATS, evacuation of hemothorax, extensive lysis of adhesions [de-identified] : 5/11/23

## 2023-05-22 NOTE — PHYSICAL EXAM
[] : no respiratory distress [Auscultation Breath Sounds / Voice Sounds] : lungs were clear to auscultation bilaterally [Heart Rate And Rhythm] : heart rate was normal and rhythm regular [Heart Sounds] : normal S1 and S2 [Heart Sounds Gallop] : no gallops [Murmurs] : no murmurs [Heart Sounds Pericardial Friction Rub] : no pericardial rub [Clean] : clean [Dry] : dry [Healing Well] : healing well [Bleeding] : no active bleeding [Foul Odor] : no foul smell [Purulent Drainage] : no purulent drainage [Serosanguinous Drainage] : no serosanguinous drainage [Erythema] : not erythematous [Warm] : not warm [Tender] : not tender [No Edema] : no edema

## 2023-06-10 LAB
CULTURE RESULTS: SIGNIFICANT CHANGE UP
SPECIMEN SOURCE: SIGNIFICANT CHANGE UP

## 2023-06-28 LAB
CULTURE RESULTS: SIGNIFICANT CHANGE UP
SPECIMEN SOURCE: SIGNIFICANT CHANGE UP

## 2023-07-07 PROBLEM — J94.2 HEMOTHORAX, LEFT: Status: ACTIVE | Noted: 2023-05-19

## 2023-07-07 NOTE — ASSESSMENT
[FreeTextEntry1] : Mr. JEM FRANCIS, 82 year old male, never smoker, w/ hx of DMII, HTN, who was admitted on 05/07 after a mechanical fall at home. He was in bed and lost his footing and fell over the radiator and hit his head  and had loss of consciousness and presented to the ED.  He was noted to have fractured ribs VII through X on the left side as well as a moderate hemothorax.\par \par Now s/p Robotic left VATS, evacuation of hemothorax, extensive lysis of adhesions on 5/11/23. Path revealed negative for pleural fluid. \par \par CXR today---- \par \par \par I have reviewed the patient's medical records and diagnostic images at time of this office consultation and have made the following recommendation:\par 1.\par \par \par I, Dr. HERNÁNDEZ, TERRELL MCGEE, personally performed the evaluation and management (E/M) services for this established patient who presents today with (a) new problem(s)/exacerbation of (an) existing condition(s).  That E/M includes conducting the examination, assessing all new/exacerbated conditions, and establishing a new plan of care.  Today, my ACP, Claudia Mcgee NP was here to observe my evaluation and management services for this new problem/exacerbated condition to be followed going forward.\par \par

## 2023-07-07 NOTE — REASON FOR VISIT
[Spouse] : spouse [de-identified] : Robotic left VATS, evacuation of hemothorax, extensive lysis of adhesions [de-identified] : 5/11/23

## 2023-07-10 ENCOUNTER — APPOINTMENT (OUTPATIENT)
Dept: THORACIC SURGERY | Facility: CLINIC | Age: 83
End: 2023-07-10

## 2023-07-10 DIAGNOSIS — J94.2 HEMOTHORAX: ICD-10-CM

## 2023-07-10 NOTE — CONSULT NOTE ADULT - PROBLEM SELECTOR PROBLEM 2
HTN (hypertension) Libtayo Pregnancy And Lactation Text: This medication is contraindicated in pregnancy and when breast feeding.

## 2023-07-11 ENCOUNTER — NON-APPOINTMENT (OUTPATIENT)
Age: 83
End: 2023-07-11

## 2023-11-01 NOTE — H&P ADULT - ASSESSMENT
83 y/o M w/ Hx of HTN, HLD, DM, CAD s/p Stents on Aspirin & Plavix who had a mechanical fall at home and now has multiple left sided rib fractures (7-10) and a moderate sized left sided hemothorax. Complex Repair And Double M Plasty Text: The defect edges were debeveled with a #15 scalpel blade.  The primary defect was closed partially with a complex linear closure.  Given the location of the remaining defect, shape of the defect and the proximity to free margins a double M plasty was deemed most appropriate for complete closure of the defect.  Using a sterile surgical marker, an appropriate advancement flap was drawn incorporating the defect and placing the expected incisions within the relaxed skin tension lines where possible.    The area thus outlined was incised deep to adipose tissue with a #15 scalpel blade.  The skin margins were undermined to an appropriate distance in all directions utilizing iris scissors.

## 2024-02-04 NOTE — ED ADULT NURSE NOTE - CAS EDP DISCH DISPOSITION ADMI
Use the prescribed antibiotics for the next 5 days.  Continue warm soaks and try to squeeze out any fluid.  If you feel like the swelling is continuing to worsen, particularly after 2 days of antibiotics, if you have fevers, chills, generalized swelling of the finger, severe pain, return to the emergency department.   CTICU/ICU

## (undated) DEVICE — SUT MONOCRYL 4-0 27" PS-2 UNDYED

## (undated) DEVICE — TROCAR SURGIQUEST AIRSEAL 12MMX100MM

## (undated) DEVICE — WARMING BLANKET LOWER ADULT

## (undated) DEVICE — BLADE SURGICAL #10 STAINLESS

## (undated) DEVICE — XI ARM GRASPER TIP UP FENESTRATED

## (undated) DEVICE — VENODYNE/SCD SLEEVE CALF MEDIUM

## (undated) DEVICE — ENDOCATCH GENERAL 10MM (PURPLE)

## (undated) DEVICE — TUBING OLYMPUS INSUFFLATION

## (undated) DEVICE — XI ARM GRASPER BIPOLAR LONG 8MM

## (undated) DEVICE — XI ENDOWRIST SUCTION IRRIGATOR 8MM

## (undated) DEVICE — D HELP - CLEARVIEW CLEARIFY SYSTEM

## (undated) DEVICE — DRAPE INSTRUMENT POUCH 6.75" X 11"

## (undated) DEVICE — SUT PDS II 2-0 27" SH

## (undated) DEVICE — ELCTR BOVIE TIP BLADE INSULATED 2.75" EDGE

## (undated) DEVICE — ENDOCATCH GENERAL 15MM (PURPLE)

## (undated) DEVICE — WARMING BLANKET UPPER ADULT

## (undated) DEVICE — DRSG CURITY GAUZE SPONGE 4 X 4" 12-PLY

## (undated) DEVICE — SUT VICRYL 0 27" UR-6

## (undated) DEVICE — GOWN XL

## (undated) DEVICE — BLADE SURGICAL #15 CARBON

## (undated) DEVICE — SUT SILK 0 24" SH DA

## (undated) DEVICE — NDL HYPO REGULAR BEVEL 22G X 1.5" (TURQUOISE)

## (undated) DEVICE — XI ARM FORCEP CADIERE 8MM

## (undated) DEVICE — SUT SURGIPRO 0 30" GS-22

## (undated) DEVICE — POSITIONER FOAM HEAD CRADLE (PINK)

## (undated) DEVICE — TUBING STRYKEFLOW II SUCTION / IRRIGATOR

## (undated) DEVICE — TROCAR COVIDIEN VERSASTEP PLUS 15MM STANDARD

## (undated) DEVICE — XI DRAPE ARM

## (undated) DEVICE — TUBING SUCTION 20FT

## (undated) DEVICE — SUT POLYSORB 2-0 30" V-20 UNDYED

## (undated) DEVICE — DRSG TEGADERM 2.5X3"

## (undated) DEVICE — XI SEAL UNIV 5- 8 MM

## (undated) DEVICE — DRSG GAUZE PACKTNER ROLL

## (undated) DEVICE — PACK ROBOTIC LIJ

## (undated) DEVICE — LIJ-ESU BOVIE MACHINE - ROBOTIC 11483366: Type: DURABLE MEDICAL EQUIPMENT

## (undated) DEVICE — XI DRAPE COLUMN

## (undated) DEVICE — SPECIMEN CONTAINER 100ML

## (undated) DEVICE — FOLEY TRAY 16FR 5CC LTX UMETER CLOSED

## (undated) DEVICE — SUT POLYSORB 4-0 P-12 UNDYED

## (undated) DEVICE — CHEST DRAIN PLEUR-EVAC DRY/WET ADULT-PEDS SINGLE (QUICK)

## (undated) DEVICE — GRASPER LAPA 5MMX35CM

## (undated) DEVICE — XI OBTURATOR OPTICAL BLADELESS 8MM

## (undated) DEVICE — SOL IRR POUR NS 0.9% 500ML

## (undated) DEVICE — XI ARM PERMANENT CAUTERY SPATULA